# Patient Record
Sex: MALE | Race: BLACK OR AFRICAN AMERICAN | NOT HISPANIC OR LATINO
[De-identification: names, ages, dates, MRNs, and addresses within clinical notes are randomized per-mention and may not be internally consistent; named-entity substitution may affect disease eponyms.]

---

## 2017-10-24 PROBLEM — Z00.00 ENCOUNTER FOR PREVENTIVE HEALTH EXAMINATION: Status: ACTIVE | Noted: 2017-10-24

## 2017-12-05 ENCOUNTER — APPOINTMENT (OUTPATIENT)
Dept: UROLOGY | Facility: CLINIC | Age: 67
End: 2017-12-05
Payer: MEDICARE

## 2017-12-05 VITALS
SYSTOLIC BLOOD PRESSURE: 142 MMHG | WEIGHT: 200 LBS | HEIGHT: 67 IN | BODY MASS INDEX: 31.39 KG/M2 | HEART RATE: 60 BPM | DIASTOLIC BLOOD PRESSURE: 76 MMHG

## 2017-12-05 PROCEDURE — 99204 OFFICE O/P NEW MOD 45 MIN: CPT | Mod: 25

## 2017-12-05 PROCEDURE — 51798 US URINE CAPACITY MEASURE: CPT

## 2017-12-06 LAB — PSA SERPL-MCNC: 0.64 NG/ML

## 2018-05-17 ENCOUNTER — APPOINTMENT (OUTPATIENT)
Dept: UROLOGY | Facility: CLINIC | Age: 68
End: 2018-05-17
Payer: MEDICARE

## 2018-05-17 VITALS
OXYGEN SATURATION: 97 % | SYSTOLIC BLOOD PRESSURE: 145 MMHG | BODY MASS INDEX: 31.39 KG/M2 | WEIGHT: 200 LBS | DIASTOLIC BLOOD PRESSURE: 80 MMHG | HEIGHT: 67 IN | HEART RATE: 58 BPM | TEMPERATURE: 98.8 F

## 2018-05-17 PROCEDURE — 99213 OFFICE O/P EST LOW 20 MIN: CPT

## 2018-05-29 ENCOUNTER — OUTPATIENT (OUTPATIENT)
Dept: OUTPATIENT SERVICES | Facility: HOSPITAL | Age: 68
LOS: 1 days | End: 2018-05-29
Payer: COMMERCIAL

## 2018-05-29 DIAGNOSIS — I20.9 ANGINA PECTORIS, UNSPECIFIED: ICD-10-CM

## 2018-05-29 PROCEDURE — 93018 CV STRESS TEST I&R ONLY: CPT

## 2018-05-29 PROCEDURE — 78452 HT MUSCLE IMAGE SPECT MULT: CPT

## 2018-05-29 PROCEDURE — A9500: CPT

## 2018-05-29 PROCEDURE — 93017 CV STRESS TEST TRACING ONLY: CPT

## 2018-05-29 PROCEDURE — 93016 CV STRESS TEST SUPVJ ONLY: CPT

## 2018-05-29 PROCEDURE — 78452 HT MUSCLE IMAGE SPECT MULT: CPT | Mod: 26

## 2018-06-05 ENCOUNTER — APPOINTMENT (OUTPATIENT)
Dept: OTOLARYNGOLOGY | Facility: CLINIC | Age: 68
End: 2018-06-05
Payer: MEDICARE

## 2018-06-05 VITALS
DIASTOLIC BLOOD PRESSURE: 78 MMHG | HEART RATE: 52 BPM | BODY MASS INDEX: 32.49 KG/M2 | HEIGHT: 67 IN | SYSTOLIC BLOOD PRESSURE: 177 MMHG | WEIGHT: 207 LBS

## 2018-06-05 DIAGNOSIS — Z98.890 OTHER SPECIFIED POSTPROCEDURAL STATES: ICD-10-CM

## 2018-06-05 DIAGNOSIS — Z87.891 PERSONAL HISTORY OF NICOTINE DEPENDENCE: ICD-10-CM

## 2018-06-05 DIAGNOSIS — H91.93 UNSPECIFIED HEARING LOSS, BILATERAL: ICD-10-CM

## 2018-06-05 PROCEDURE — 92550 TYMPANOMETRY & REFLEX THRESH: CPT

## 2018-06-05 PROCEDURE — 99204 OFFICE O/P NEW MOD 45 MIN: CPT | Mod: 25

## 2018-06-05 PROCEDURE — 31575 DIAGNOSTIC LARYNGOSCOPY: CPT

## 2018-06-05 PROCEDURE — 92557 COMPREHENSIVE HEARING TEST: CPT

## 2018-07-17 ENCOUNTER — APPOINTMENT (OUTPATIENT)
Dept: OTOLARYNGOLOGY | Facility: CLINIC | Age: 68
End: 2018-07-17
Payer: MEDICARE

## 2018-07-17 VITALS
HEART RATE: 72 BPM | HEIGHT: 67 IN | BODY MASS INDEX: 32.02 KG/M2 | DIASTOLIC BLOOD PRESSURE: 73 MMHG | SYSTOLIC BLOOD PRESSURE: 123 MMHG | WEIGHT: 204 LBS

## 2018-07-17 DIAGNOSIS — R07.0 PAIN IN THROAT: ICD-10-CM

## 2018-07-17 PROCEDURE — 99214 OFFICE O/P EST MOD 30 MIN: CPT

## 2018-12-04 ENCOUNTER — APPOINTMENT (OUTPATIENT)
Dept: UROLOGY | Facility: CLINIC | Age: 68
End: 2018-12-04
Payer: MEDICARE

## 2018-12-04 VITALS
BODY MASS INDEX: 32.02 KG/M2 | HEIGHT: 67 IN | TEMPERATURE: 98.4 F | SYSTOLIC BLOOD PRESSURE: 161 MMHG | DIASTOLIC BLOOD PRESSURE: 91 MMHG | HEART RATE: 74 BPM | WEIGHT: 204 LBS

## 2018-12-04 PROCEDURE — 99213 OFFICE O/P EST LOW 20 MIN: CPT | Mod: 25

## 2018-12-04 PROCEDURE — 51741 ELECTRO-UROFLOWMETRY FIRST: CPT

## 2018-12-04 PROCEDURE — 51798 US URINE CAPACITY MEASURE: CPT

## 2018-12-05 LAB
APPEARANCE: CLEAR
BACTERIA: NEGATIVE
BILIRUBIN URINE: NEGATIVE
BLOOD URINE: NEGATIVE
COLOR: YELLOW
GLUCOSE QUALITATIVE U: NEGATIVE MG/DL
HYALINE CASTS: 1 /LPF
KETONES URINE: ABNORMAL
LEUKOCYTE ESTERASE URINE: NEGATIVE
MICROSCOPIC-UA: NORMAL
NITRITE URINE: NEGATIVE
PH URINE: 6
PROTEIN URINE: NEGATIVE MG/DL
PSA SERPL-MCNC: 0.58 NG/ML
RED BLOOD CELLS URINE: 1 /HPF
SPECIFIC GRAVITY URINE: 1.03
SQUAMOUS EPITHELIAL CELLS: 1 /HPF
UROBILINOGEN URINE: 1 MG/DL
WHITE BLOOD CELLS URINE: 1 /HPF

## 2018-12-10 LAB — BACTERIA UR CULT: NORMAL

## 2019-01-15 ENCOUNTER — APPOINTMENT (OUTPATIENT)
Dept: GASTROENTEROLOGY | Facility: CLINIC | Age: 69
End: 2019-01-15

## 2019-08-08 ENCOUNTER — APPOINTMENT (OUTPATIENT)
Dept: PULMONOLOGY | Facility: CLINIC | Age: 69
End: 2019-08-08
Payer: MEDICARE

## 2019-08-08 ENCOUNTER — OTHER (OUTPATIENT)
Age: 69
End: 2019-08-08

## 2019-08-08 VITALS
DIASTOLIC BLOOD PRESSURE: 59 MMHG | HEIGHT: 67 IN | OXYGEN SATURATION: 98 % | HEART RATE: 63 BPM | TEMPERATURE: 98.5 F | SYSTOLIC BLOOD PRESSURE: 104 MMHG

## 2019-08-08 VITALS — BODY MASS INDEX: 31.32 KG/M2 | WEIGHT: 200 LBS

## 2019-08-08 DIAGNOSIS — G47.9 SLEEP DISORDER, UNSPECIFIED: ICD-10-CM

## 2019-08-08 PROCEDURE — 99204 OFFICE O/P NEW MOD 45 MIN: CPT

## 2019-08-08 NOTE — REVIEW OF SYSTEMS
[EDS: ESS=____] : daytime somnolence: ESS=[unfilled] [Snoring] : snoring [Witnessed Apneas] : witnessed apnea [Obesity] : obesity [Nocturia] : nocturia [Negative] : Psychiatric [FreeTextEntry9] : HTN  [FreeTextEntry5] : BPH

## 2019-08-08 NOTE — PHYSICAL EXAM
[General Appearance - Well Developed] : well developed [Normal Appearance] : normal appearance [Well Groomed] : well groomed [No Deformities] : no deformities [General Appearance - Well Nourished] : well nourished [Normal Conjunctiva] : the conjunctiva exhibited no abnormalities [General Appearance - In No Acute Distress] : no acute distress [Eyelids - No Xanthelasma] : the eyelids demonstrated no xanthelasmas [III] : III [Neck Appearance] : the appearance of the neck was normal [Jugular Venous Distention Increased] : there was no jugular-venous distention [Neck Cervical Mass (___cm)] : no neck mass was observed [Thyroid Diffuse Enlargement] : the thyroid was not enlarged [Thyroid Nodule] : there were no palpable thyroid nodules [Heart Rate And Rhythm] : heart rate was normal and rhythm regular [Heart Sounds] : normal S1 and S2 [Heart Sounds Gallop] : no gallops [Murmurs] : no murmurs [Heart Sounds Pericardial Friction Rub] : no pericardial rub [Abnormal Walk] : normal gait [Motor Tone] : muscle strength and tone were normal [Nail Clubbing] : no clubbing of the fingernails [Musculoskeletal - Swelling] : no joint swelling seen [Petechial Hemorrhages (___cm)] : no petechial hemorrhages [Cyanosis, Localized] : no localized cyanosis [Skin Color & Pigmentation] : normal skin color and pigmentation [] : no rash [Deep Tendon Reflexes (DTR)] : deep tendon reflexes were 2+ and symmetric [Skin Turgor] : normal skin turgor [Sensation] : the sensory exam was normal to light touch and pinprick [No Focal Deficits] : no focal deficits [Impaired Insight] : insight and judgment were intact [Affect] : the affect was normal [Oriented To Time, Place, And Person] : oriented to person, place, and time

## 2019-08-08 NOTE — ASSESSMENT
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

## 2019-08-08 NOTE — HISTORY OF PRESENT ILLNESS
[FreeTextEntry1] : 08/08/2019 :  JOHN COOKSEY is a 68 year male with PMHx GERD on PPI,BPH,  who is here for\par \par

## 2019-08-08 NOTE — ASSESSMENT
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

## 2019-08-08 NOTE — END OF VISIT
[FreeTextEntry3] : Case reviewed and patient examined with PA, plans as noted.  Has dream enacting behavior by history, frequent, injured self at least once.   This can be dangerous, warned patient, asked him to start using melatonin 5-6mg HS now, pending overnight polysomnography to confirm dx.  Told him this can be start of degenerative neurologic disease. f/u after overnight polysomnography v

## 2019-08-08 NOTE — PHYSICAL EXAM
[Normal Appearance] : normal appearance [General Appearance - Well Developed] : well developed [Well Groomed] : well groomed [No Deformities] : no deformities [General Appearance - Well Nourished] : well nourished [General Appearance - In No Acute Distress] : no acute distress [Normal Conjunctiva] : the conjunctiva exhibited no abnormalities [Eyelids - No Xanthelasma] : the eyelids demonstrated no xanthelasmas [III] : III [Neck Appearance] : the appearance of the neck was normal [Neck Cervical Mass (___cm)] : no neck mass was observed [Jugular Venous Distention Increased] : there was no jugular-venous distention [Thyroid Nodule] : there were no palpable thyroid nodules [Thyroid Diffuse Enlargement] : the thyroid was not enlarged [Heart Sounds] : normal S1 and S2 [Heart Rate And Rhythm] : heart rate was normal and rhythm regular [Heart Sounds Gallop] : no gallops [Heart Sounds Pericardial Friction Rub] : no pericardial rub [Murmurs] : no murmurs [Abnormal Walk] : normal gait [Musculoskeletal - Swelling] : no joint swelling seen [Motor Tone] : muscle strength and tone were normal [Nail Clubbing] : no clubbing of the fingernails [Petechial Hemorrhages (___cm)] : no petechial hemorrhages [Cyanosis, Localized] : no localized cyanosis [Skin Color & Pigmentation] : normal skin color and pigmentation [Skin Turgor] : normal skin turgor [] : no rash [Deep Tendon Reflexes (DTR)] : deep tendon reflexes were 2+ and symmetric [Sensation] : the sensory exam was normal to light touch and pinprick [No Focal Deficits] : no focal deficits [Affect] : the affect was normal [Oriented To Time, Place, And Person] : oriented to person, place, and time [Impaired Insight] : insight and judgment were intact

## 2019-09-12 ENCOUNTER — OTHER (OUTPATIENT)
Age: 69
End: 2019-09-12

## 2019-10-02 ENCOUNTER — APPOINTMENT (OUTPATIENT)
Dept: SLEEP CENTER | Facility: HOSPITAL | Age: 69
End: 2019-10-02

## 2019-10-02 ENCOUNTER — OUTPATIENT (OUTPATIENT)
Dept: OUTPATIENT SERVICES | Facility: HOSPITAL | Age: 69
LOS: 1 days | End: 2019-10-02
Payer: MEDICARE

## 2019-10-02 DIAGNOSIS — G47.33 OBSTRUCTIVE SLEEP APNEA (ADULT) (PEDIATRIC): ICD-10-CM

## 2019-10-02 PROCEDURE — 95810 POLYSOM 6/> YRS 4/> PARAM: CPT

## 2019-10-02 PROCEDURE — 95810 POLYSOM 6/> YRS 4/> PARAM: CPT | Mod: 26

## 2019-10-04 ENCOUNTER — OTHER (OUTPATIENT)
Age: 69
End: 2019-10-04

## 2019-11-04 VITALS
SYSTOLIC BLOOD PRESSURE: 140 MMHG | RESPIRATION RATE: 17 BRPM | DIASTOLIC BLOOD PRESSURE: 80 MMHG | HEIGHT: 67 IN | WEIGHT: 199.96 LBS | TEMPERATURE: 98 F | OXYGEN SATURATION: 98 % | HEART RATE: 56 BPM

## 2019-11-04 RX ORDER — CHLORHEXIDINE GLUCONATE 213 G/1000ML
1 SOLUTION TOPICAL ONCE
Refills: 0 | Status: DISCONTINUED | OUTPATIENT
Start: 2019-11-06 | End: 2019-11-07

## 2019-11-04 NOTE — H&P ADULT - NSICDXPASTSURGICALHX_GEN_ALL_CORE_FT
PAST SURGICAL HISTORY:  H/O decompression of ulnar nerve     H/O hernia repair right inguinal    H/O repair of right rotator cuff x 2    H/O shoulder surgery

## 2019-11-04 NOTE — H&P ADULT - HISTORY OF PRESENT ILLNESS
69 yo active male with fhx of CVA and PMH HTN, HLD, glaucoma, BPH, and pre-diabetes who presented to his cardiologist, Dr. Rodriguez, with complaints of "tight" substernal 8/10 non-radiating chest pain and with exertion at 2-3 blocks ambulation, that resolves with 30 min of exercise. He states if he is swimming, it will resolve after going around 1/4 mile. Patient endorses some mild pedal edema. Denies any SOB, dizziness, palpitations, syncope, orthopnea, PND, melena, fevers, or chills. Stress echo 10/9/19: On B-B, stopped at 6:29 due to angina, reached 62% MPHR and 10 METS. Ventricular couplets seen on stress EKG. Post stress echo with mild global hypokinesis. Blunted HR and hypertensive response to exercise (began test at 168/84, Peak 174/90, highest 188/90 with 2 min rest post-stress). No comment on EF, however per MD note, stress MPI from 5/2018 with EF 59%. Due to patient's risk factors, abnormal stress test, and class III anginal symptoms, patient presents to [facility] for cardiac cath with PCI as clinically indicated.

## 2019-11-04 NOTE — H&P ADULT - ASSESSMENT
69 yo active male with fhx of CVA and PMH HTN, HLD, glaucoma, BPH, and pre-diabetes who presented to his cardiologist, Dr. Rodriguez, with complaints of CCS Class III Anginal symptoms, with subsequent Stress echo 10/9/19: On B-B, stopped at 6:29 due to angina, reached 62% MPHR and 10 METS. Ventricular couplets seen on stress EKG. Post stress echo with mild global hypokinesis. Blunted HR and hypertensive response to exercise (began test at 168/84, Peak 174/90, highest 188/90 with 2 min rest post-stress). No comment on EF, however per MD note, stress MPI from 5/2018 with EF 59%.   Given patient's risk factors, abnormal stress test, and class III anginal symptoms, patient now presents to [facility] for recommended cardiac cath with PCI as clinically indicated.     ASA:  III                                     Mallampati:  II  Risks & benefits of procedure and alternative therapy have been explained to the patient including but not limited to: allergic reaction, bleeding w/possible need for blood transfusion, infection, renal and vascular compromise, limb damage, arrhythmia, stroke, vessel dissection/perforation, Myocardial infarction, emergent CABG. Informed consent obtained and in chart.     Of note:  Pt took his home dose ASA 81mg today, Plavix 600mg load given.  NS @ 75x 4 hours for hydration.

## 2019-11-06 ENCOUNTER — INPATIENT (INPATIENT)
Facility: HOSPITAL | Age: 69
LOS: 0 days | Discharge: HOME | DRG: 251 | End: 2019-11-07
Attending: INTERNAL MEDICINE | Admitting: INTERNAL MEDICINE
Payer: MEDICARE

## 2019-11-06 DIAGNOSIS — Z98.890 OTHER SPECIFIED POSTPROCEDURAL STATES: Chronic | ICD-10-CM

## 2019-11-06 LAB
ALBUMIN SERPL ELPH-MCNC: 4.5 G/DL — SIGNIFICANT CHANGE UP (ref 3.3–5)
ALP SERPL-CCNC: 72 U/L — SIGNIFICANT CHANGE UP (ref 40–120)
ALT FLD-CCNC: 15 U/L — SIGNIFICANT CHANGE UP (ref 10–45)
ANION GAP SERPL CALC-SCNC: 11 MMOL/L — SIGNIFICANT CHANGE UP (ref 5–17)
APTT BLD: 30.2 SEC — SIGNIFICANT CHANGE UP (ref 27.5–36.3)
AST SERPL-CCNC: 18 U/L — SIGNIFICANT CHANGE UP (ref 10–40)
BASOPHILS # BLD AUTO: 0.05 K/UL — SIGNIFICANT CHANGE UP (ref 0–0.2)
BASOPHILS NFR BLD AUTO: 1 % — SIGNIFICANT CHANGE UP (ref 0–2)
BILIRUB SERPL-MCNC: 0.3 MG/DL — SIGNIFICANT CHANGE UP (ref 0.2–1.2)
BUN SERPL-MCNC: 22 MG/DL — SIGNIFICANT CHANGE UP (ref 7–23)
CALCIUM SERPL-MCNC: 8.9 MG/DL — SIGNIFICANT CHANGE UP (ref 8.4–10.5)
CHLORIDE SERPL-SCNC: 105 MMOL/L — SIGNIFICANT CHANGE UP (ref 96–108)
CHOLEST SERPL-MCNC: 225 MG/DL — HIGH (ref 10–199)
CO2 SERPL-SCNC: 25 MMOL/L — SIGNIFICANT CHANGE UP (ref 22–31)
CREAT SERPL-MCNC: 1.14 MG/DL — SIGNIFICANT CHANGE UP (ref 0.5–1.3)
EOSINOPHIL # BLD AUTO: 0.81 K/UL — HIGH (ref 0–0.5)
EOSINOPHIL NFR BLD AUTO: 15.4 % — HIGH (ref 0–6)
GLUCOSE SERPL-MCNC: 106 MG/DL — HIGH (ref 70–99)
HBA1C BLD-MCNC: 5.9 % — HIGH (ref 4–5.6)
HCT VFR BLD CALC: 42.4 % — SIGNIFICANT CHANGE UP (ref 39–50)
HDLC SERPL-MCNC: 77 MG/DL — SIGNIFICANT CHANGE UP
HGB BLD-MCNC: 13.6 G/DL — SIGNIFICANT CHANGE UP (ref 13–17)
IMM GRANULOCYTES NFR BLD AUTO: 0.6 % — SIGNIFICANT CHANGE UP (ref 0–1.5)
INR BLD: 0.99 — SIGNIFICANT CHANGE UP (ref 0.88–1.16)
LIPID PNL WITH DIRECT LDL SERPL: 129 MG/DL — HIGH
LYMPHOCYTES # BLD AUTO: 1.6 K/UL — SIGNIFICANT CHANGE UP (ref 1–3.3)
LYMPHOCYTES # BLD AUTO: 30.5 % — SIGNIFICANT CHANGE UP (ref 13–44)
MCHC RBC-ENTMCNC: 28.7 PG — SIGNIFICANT CHANGE UP (ref 27–34)
MCHC RBC-ENTMCNC: 32.1 GM/DL — SIGNIFICANT CHANGE UP (ref 32–36)
MCV RBC AUTO: 89.5 FL — SIGNIFICANT CHANGE UP (ref 80–100)
MONOCYTES # BLD AUTO: 0.78 K/UL — SIGNIFICANT CHANGE UP (ref 0–0.9)
MONOCYTES NFR BLD AUTO: 14.9 % — HIGH (ref 2–14)
NEUTROPHILS # BLD AUTO: 1.98 K/UL — SIGNIFICANT CHANGE UP (ref 1.8–7.4)
NEUTROPHILS NFR BLD AUTO: 37.6 % — LOW (ref 43–77)
NRBC # BLD: 0 /100 WBCS — SIGNIFICANT CHANGE UP (ref 0–0)
PLATELET # BLD AUTO: 241 K/UL — SIGNIFICANT CHANGE UP (ref 150–400)
POTASSIUM SERPL-MCNC: 3.8 MMOL/L — SIGNIFICANT CHANGE UP (ref 3.5–5.3)
POTASSIUM SERPL-SCNC: 3.8 MMOL/L — SIGNIFICANT CHANGE UP (ref 3.5–5.3)
PROT SERPL-MCNC: 7.1 G/DL — SIGNIFICANT CHANGE UP (ref 6–8.3)
PROTHROM AB SERPL-ACNC: 11.2 SEC — SIGNIFICANT CHANGE UP (ref 10–12.9)
RBC # BLD: 4.74 M/UL — SIGNIFICANT CHANGE UP (ref 4.2–5.8)
RBC # FLD: 12.9 % — SIGNIFICANT CHANGE UP (ref 10.3–14.5)
SODIUM SERPL-SCNC: 141 MMOL/L — SIGNIFICANT CHANGE UP (ref 135–145)
TOTAL CHOLESTEROL/HDL RATIO MEASUREMENT: 2.9 RATIO — LOW (ref 3.4–9.6)
TRIGL SERPL-MCNC: 96 MG/DL — SIGNIFICANT CHANGE UP (ref 10–149)
WBC # BLD: 5.25 K/UL — SIGNIFICANT CHANGE UP (ref 3.8–10.5)
WBC # FLD AUTO: 5.25 K/UL — SIGNIFICANT CHANGE UP (ref 3.8–10.5)

## 2019-11-06 PROCEDURE — 99221 1ST HOSP IP/OBS SF/LOW 40: CPT

## 2019-11-06 PROCEDURE — 92920 PRQ TRLUML C ANGIOP 1ART&/BR: CPT | Mod: RC

## 2019-11-06 PROCEDURE — 93458 L HRT ARTERY/VENTRICLE ANGIO: CPT | Mod: 26,59

## 2019-11-06 RX ORDER — LATANOPROST 0.05 MG/ML
1 SOLUTION/ DROPS OPHTHALMIC; TOPICAL AT BEDTIME
Refills: 0 | Status: DISCONTINUED | OUTPATIENT
Start: 2019-11-06 | End: 2019-11-07

## 2019-11-06 RX ORDER — CLOPIDOGREL BISULFATE 75 MG/1
600 TABLET, FILM COATED ORAL ONCE
Refills: 0 | Status: COMPLETED | OUTPATIENT
Start: 2019-11-06 | End: 2019-11-06

## 2019-11-06 RX ORDER — POTASSIUM CHLORIDE 20 MEQ
20 PACKET (EA) ORAL ONCE
Refills: 0 | Status: COMPLETED | OUTPATIENT
Start: 2019-11-06 | End: 2019-11-06

## 2019-11-06 RX ORDER — SODIUM CHLORIDE 9 MG/ML
500 INJECTION INTRAMUSCULAR; INTRAVENOUS; SUBCUTANEOUS
Refills: 0 | Status: DISCONTINUED | OUTPATIENT
Start: 2019-11-06 | End: 2019-11-07

## 2019-11-06 RX ORDER — CLOPIDOGREL BISULFATE 75 MG/1
75 TABLET, FILM COATED ORAL DAILY
Refills: 0 | Status: DISCONTINUED | OUTPATIENT
Start: 2019-11-07 | End: 2019-11-07

## 2019-11-06 RX ORDER — SODIUM CHLORIDE 9 MG/ML
500 INJECTION INTRAMUSCULAR; INTRAVENOUS; SUBCUTANEOUS
Refills: 0 | Status: DISCONTINUED | OUTPATIENT
Start: 2019-11-06 | End: 2019-11-06

## 2019-11-06 RX ORDER — INFLUENZA VIRUS VACCINE 15; 15; 15; 15 UG/.5ML; UG/.5ML; UG/.5ML; UG/.5ML
0.5 SUSPENSION INTRAMUSCULAR ONCE
Refills: 0 | Status: DISCONTINUED | OUTPATIENT
Start: 2019-11-06 | End: 2019-11-07

## 2019-11-06 RX ORDER — ASPIRIN/CALCIUM CARB/MAGNESIUM 324 MG
81 TABLET ORAL DAILY
Refills: 0 | Status: DISCONTINUED | OUTPATIENT
Start: 2019-11-07 | End: 2019-11-07

## 2019-11-06 RX ORDER — ATORVASTATIN CALCIUM 80 MG/1
40 TABLET, FILM COATED ORAL AT BEDTIME
Refills: 0 | Status: DISCONTINUED | OUTPATIENT
Start: 2019-11-06 | End: 2019-11-07

## 2019-11-06 RX ADMIN — CLOPIDOGREL BISULFATE 600 MILLIGRAM(S): 75 TABLET, FILM COATED ORAL at 08:35

## 2019-11-06 RX ADMIN — SODIUM CHLORIDE 75 MILLILITER(S): 9 INJECTION INTRAMUSCULAR; INTRAVENOUS; SUBCUTANEOUS at 13:53

## 2019-11-06 RX ADMIN — LATANOPROST 1 DROP(S): 0.05 SOLUTION/ DROPS OPHTHALMIC; TOPICAL at 21:36

## 2019-11-06 RX ADMIN — ATORVASTATIN CALCIUM 40 MILLIGRAM(S): 80 TABLET, FILM COATED ORAL at 21:35

## 2019-11-06 RX ADMIN — SODIUM CHLORIDE 75 MILLILITER(S): 9 INJECTION INTRAMUSCULAR; INTRAVENOUS; SUBCUTANEOUS at 08:35

## 2019-11-06 NOTE — CONSULT NOTE ADULT - SUBJECTIVE AND OBJECTIVE BOX
Preventive Cardiology Consultation Note    Cardiologist- Dr. Rodriguez     CHIEF COMPLAINT: s/p cardiac catheterization requiring cardiovascular prevention optimization and education    HISTORY OF PRESENT ILLNESS: 69 yo active male with fhx of CVA and PMH HTN, HLD, glaucoma, BPH, and pre-diabetes. Patient is now s/p Cardiac Catheterization 11/6/2019: PTCA to RCA, LM: normal, mLAD: 40%, dLCx: 50%, dRPDA: 80% diffuse, EF: 60%, EDP: 10mmHg, no AS/MR.    Review of systems otherwise negative.     Lifestyle History:  Mediterranean Diet Score (9 question survey) was 6.   (8-9: optimal, 6-7: near-optimal, 4-5: suboptimal, 0-3: markedly suboptimal)  Exercise: Patient reports exercising at a moderate level for >150 minutes per week.    Smoking: Former smoker (10 pack year history; quit 25 years ago).   Stress: Patient denies any stress.     PAST MEDICAL & SURGICAL HISTORY:  BPH (benign prostatic hyperplasia)  Glaucoma  Pre-diabetes  Hypertension  Hyperlipidemia  H/O hernia repair: right inguinal  H/O decompression of ulnar nerve  H/O shoulder surgery  H/O repair of right rotator cuff: x 2    FAMILY HISTORY:   CVA - father at 60 yo and mother at 83 yo.    Allergies:   No Known Allergies      HOME MEDICATIONS:   latanoprost 0.005% ophthalmic solution: 1 drop(s) to each affected eye once a day (in the evening) (04 Nov 2019 18:22)      PHYSICAL EXAM:  T(C): 36.8 (11-06-19 @ 14:00), Max: 36.8 (11-06-19 @ 14:00)  T(F): 98.2 (11-06-19 @ 14:00), Max: 98.2 (11-06-19 @ 14:00)  HR: 60 (11-06-19 @ 13:44) (60 - 60)  BP: 143/63 (11-06-19 @ 13:44) (143/63 - 143/63)  RR: 17 (11-06-19 @ 13:44) (17 - 17)  SpO2: 99% (11-06-19 @ 13:44) (99% - 99%)  Height (cm): 170.18 (11-06 @ 07:55)  Weight (kg): 90.7 (11-06 @ 07:55)  BMI (kg/m2): 31.3 (11-06 @ 07:55)  	  Gen- awake, conversive  Head-NCAT; eyes: no corneal arcus noted b/l; no xanthelasmas   Neck- no thyromegaly, no cervical LAD, no JVD, no carotid bruit b/l  Respiratory- good air entry b/l, no WRR  Cardiovascular- S1S2, RRR, no MRG appr, no LE edema b/l, distal pulses 2+ b/l  Gastrointestinal- no HSM, no masses  Neurology- moves all extremities, no focal deficits  Psych- normal affect, non-depressed mood  Skin- no lesions, no rashes, no xanthomas     LABS:	                        13.6   5.25  )-----------( 241      ( 06 Nov 2019 07:29 )             42.4     11-06    141  |  105  |  22  ----------------------------<  106<H>  3.8   |  25  |  1.14    Ca    8.9      06 Nov 2019 07:29    TPro  7.1  /  Alb  4.5  /  TBili  0.3  /  DBili  x   /  AST  18  /  ALT  15  /  AlkPhos  72  11-06      Hemoglobin A1C, Whole Blood: 5.9 % (11-06 @ 07:29)      Cholesterol, Serum: 225 mg/dL (11-06 @ 07:29)  HDL Cholesterol, Serum: 77 mg/dL (11-06 @ 07:29)  Triglycerides, Serum: 96 mg/dL (11-06 @ 07:29)  Direct LDL: 129 mg/dL (11-06 @ 07:29)      ASSESSMENT/RECOMMENDATIONS: 	  Patient's dietary, exercise and overall lifestyle habits were reviewed. The concept of atherosclerosis and its systemic nature was discussed with a focus on the need to get all cardiovascular risk factors to goal. At this time, I would like to make the following recommendations to optimize atherosclerotic risk factors.     RECOMMENDATIONS:   Anti-platelet Therapy: DAPT per interventionalist recommendation.   Lipid Therapy: High dose statin therapy would likely benefit this patient. In general, we recommend the use of atorvastatin or rosuvastatin, as tolerated. Along with lifestyle modifications, we recommend starting the patient on either of those agents, with the goal of lowering his LDL-C by 50%.   Hypertension: Blood pressures during this stay were well-controlled.   Mediterranean Diet Score is 6. Some suggestions include continue incorporating 2 or more servings per day of vegetables, fruits, and whole grains. Increase intake of fish and legumes/beans to 2 or more servings per week. Aim to increase intake of healthy fats, such as olive oil and avocados, and have a handful of nuts/seeds most days. Reduce red/processed meat consumption to 2 or fewer times per week.   Exercise: Recommended gradually increasing activity to 30-45 minutes most days of the week once cleared by referring cardiologist. Cardiac rehab might benefit this patient and is covered by major insurance plans (other than co-pays), please refer.   Medication Adherence: Patient has no issues with adherence at this time.   Smoking: This patient is a non-smoker.   Obesity/Overweight: The patient was advised about specific mechanisms such as reduced portions and increased activity for efforts toward weight loss.   Glucometabolic State: Patient has known prediabetes. Based on HbA1c 5.9% today, he is still in the prediabetic range. We recommend following up with his PCP for continued monitoring, and to initiate treatment if necessary.   Sleep Apnea: The patient is at low risk for sleep apnea.   Psychological Stress: The patient appears to be coping with stressors well at this time.     Thank you for the opportunity to see this patient. Please feel free to contact Prevention if there are any questions, or if you feel that your patient would benefit from continued follow-up visits with the Program.    I am acting as a scribe on behalf of Dr. Alexandrea Fleming,    Clara Garza, [facility]  Cardiovascular Prevention     Alexandrea Fleming MD  System Director, Cardiovascular Prevention

## 2019-11-07 ENCOUNTER — TRANSCRIPTION ENCOUNTER (OUTPATIENT)
Age: 69
End: 2019-11-07

## 2019-11-07 VITALS — RESPIRATION RATE: 18 BRPM | HEART RATE: 63 BPM | DIASTOLIC BLOOD PRESSURE: 87 MMHG | SYSTOLIC BLOOD PRESSURE: 134 MMHG

## 2019-11-07 LAB
ALBUMIN SERPL ELPH-MCNC: 4.1 G/DL — SIGNIFICANT CHANGE UP (ref 3.3–5)
ALP SERPL-CCNC: 61 U/L — SIGNIFICANT CHANGE UP (ref 40–120)
ALT FLD-CCNC: 13 U/L — SIGNIFICANT CHANGE UP (ref 10–45)
ANION GAP SERPL CALC-SCNC: 10 MMOL/L — SIGNIFICANT CHANGE UP (ref 5–17)
AST SERPL-CCNC: 19 U/L — SIGNIFICANT CHANGE UP (ref 10–40)
BILIRUB SERPL-MCNC: 0.4 MG/DL — SIGNIFICANT CHANGE UP (ref 0.2–1.2)
BUN SERPL-MCNC: 14 MG/DL — SIGNIFICANT CHANGE UP (ref 7–23)
CALCIUM SERPL-MCNC: 9.3 MG/DL — SIGNIFICANT CHANGE UP (ref 8.4–10.5)
CHLORIDE SERPL-SCNC: 103 MMOL/L — SIGNIFICANT CHANGE UP (ref 96–108)
CO2 SERPL-SCNC: 26 MMOL/L — SIGNIFICANT CHANGE UP (ref 22–31)
CREAT SERPL-MCNC: 1.16 MG/DL — SIGNIFICANT CHANGE UP (ref 0.5–1.3)
GLUCOSE SERPL-MCNC: 97 MG/DL — SIGNIFICANT CHANGE UP (ref 70–99)
HCT VFR BLD CALC: 40 % — SIGNIFICANT CHANGE UP (ref 39–50)
HCV AB S/CO SERPL IA: 0.07 S/CO — SIGNIFICANT CHANGE UP
HCV AB SERPL-IMP: SIGNIFICANT CHANGE UP
HGB BLD-MCNC: 13.4 G/DL — SIGNIFICANT CHANGE UP (ref 13–17)
MAGNESIUM SERPL-MCNC: 2.3 MG/DL — SIGNIFICANT CHANGE UP (ref 1.6–2.6)
MCHC RBC-ENTMCNC: 29.1 PG — SIGNIFICANT CHANGE UP (ref 27–34)
MCHC RBC-ENTMCNC: 33.5 GM/DL — SIGNIFICANT CHANGE UP (ref 32–36)
MCV RBC AUTO: 86.8 FL — SIGNIFICANT CHANGE UP (ref 80–100)
NRBC # BLD: 0 /100 WBCS — SIGNIFICANT CHANGE UP (ref 0–0)
PLATELET # BLD AUTO: 241 K/UL — SIGNIFICANT CHANGE UP (ref 150–400)
POTASSIUM SERPL-MCNC: 3.9 MMOL/L — SIGNIFICANT CHANGE UP (ref 3.5–5.3)
POTASSIUM SERPL-SCNC: 3.9 MMOL/L — SIGNIFICANT CHANGE UP (ref 3.5–5.3)
PROT SERPL-MCNC: 7.1 G/DL — SIGNIFICANT CHANGE UP (ref 6–8.3)
RBC # BLD: 4.61 M/UL — SIGNIFICANT CHANGE UP (ref 4.2–5.8)
RBC # FLD: 13 % — SIGNIFICANT CHANGE UP (ref 10.3–14.5)
SODIUM SERPL-SCNC: 139 MMOL/L — SIGNIFICANT CHANGE UP (ref 135–145)
WBC # BLD: 6.06 K/UL — SIGNIFICANT CHANGE UP (ref 3.8–10.5)
WBC # FLD AUTO: 6.06 K/UL — SIGNIFICANT CHANGE UP (ref 3.8–10.5)

## 2019-11-07 PROCEDURE — 99239 HOSP IP/OBS DSCHRG MGMT >30: CPT

## 2019-11-07 RX ORDER — POTASSIUM CHLORIDE 20 MEQ
20 PACKET (EA) ORAL ONCE
Refills: 0 | Status: COMPLETED | OUTPATIENT
Start: 2019-11-07 | End: 2019-11-07

## 2019-11-07 RX ORDER — LATANOPROST 0.05 MG/ML
1 SOLUTION/ DROPS OPHTHALMIC; TOPICAL
Qty: 0 | Refills: 0 | DISCHARGE

## 2019-11-07 RX ORDER — ATORVASTATIN CALCIUM 80 MG/1
1 TABLET, FILM COATED ORAL
Qty: 30 | Refills: 3
Start: 2019-11-07 | End: 2020-03-05

## 2019-11-07 RX ORDER — AMLODIPINE BESYLATE AND BENAZEPRIL HYDROCHLORIDE 10; 20 MG/1; MG/1
1 CAPSULE ORAL
Qty: 0 | Refills: 0 | DISCHARGE

## 2019-11-07 RX ORDER — LANOLIN ALCOHOL/MO/W.PET/CERES
1 CREAM (GRAM) TOPICAL
Qty: 0 | Refills: 0 | DISCHARGE

## 2019-11-07 RX ORDER — LISINOPRIL 2.5 MG/1
20 TABLET ORAL DAILY
Refills: 0 | Status: DISCONTINUED | OUTPATIENT
Start: 2019-11-07 | End: 2019-11-07

## 2019-11-07 RX ORDER — CLOPIDOGREL BISULFATE 75 MG/1
1 TABLET, FILM COATED ORAL
Qty: 30 | Refills: 11
Start: 2019-11-07 | End: 2020-10-31

## 2019-11-07 RX ORDER — AMLODIPINE BESYLATE 2.5 MG/1
5 TABLET ORAL DAILY
Refills: 0 | Status: DISCONTINUED | OUTPATIENT
Start: 2019-11-07 | End: 2019-11-07

## 2019-11-07 RX ORDER — ASPIRIN/CALCIUM CARB/MAGNESIUM 324 MG
1 TABLET ORAL
Qty: 30 | Refills: 11
Start: 2019-11-07 | End: 2020-10-31

## 2019-11-07 RX ADMIN — Medication 20 MILLIEQUIVALENT(S): at 09:43

## 2019-11-07 RX ADMIN — LISINOPRIL 20 MILLIGRAM(S): 2.5 TABLET ORAL at 11:23

## 2019-11-07 RX ADMIN — Medication 81 MILLIGRAM(S): at 11:23

## 2019-11-07 RX ADMIN — AMLODIPINE BESYLATE 5 MILLIGRAM(S): 2.5 TABLET ORAL at 09:43

## 2019-11-07 RX ADMIN — CLOPIDOGREL BISULFATE 75 MILLIGRAM(S): 75 TABLET, FILM COATED ORAL at 11:23

## 2019-11-07 NOTE — DISCHARGE NOTE PROVIDER - NSDCMRMEDTOKEN_GEN_ALL_CORE_FT
amlodipine-benazepril 5 mg-20 mg oral capsule: 1 cap(s) orally once a day  Hemp Gummies: 1 tab(s) orally once a day  latanoprost 0.005% ophthalmic solution: 1 drop(s) to each affected eye once a day (in the evening)  Melatonin: 1 tab(s) orally once a day  Move Free Joints: 1 tab(s) orally once a day  naproxen 500 mg oral tablet: 1 tab(s) orally 2 times a day  Nerve Renew: 1 tab(s) orally once a day amlodipine-benazepril 5 mg-20 mg oral capsule: 1 cap(s) orally once a day  aspirin 81 mg oral delayed release tablet: 1 tab(s) orally once a day  atorvastatin 40 mg oral tablet: 1 tab(s) orally once a day (at bedtime)  clopidogrel 75 mg oral tablet: 1 tab(s) orally once a day  Hemp Gummies: 1 tab(s) orally once a day  latanoprost 0.005% ophthalmic solution: 1 drop(s) to each affected eye once a day (in the evening)  Melatonin: 1 tab(s) orally once a day  Move Free Joints: 1 tab(s) orally once a day  Nerve Renew: 1 tab(s) orally once a day

## 2019-11-07 NOTE — DISCHARGE NOTE PROVIDER - NSDCFUADDAPPT_GEN_ALL_CORE_FT
•	Your procedure was done through your groin and wrist   •	You do not need to keep this area covered and you may shower  •	Please avoid any heavy lifting  (no more than 3 to 5 lbs) or strenuous activity for five days  •	If you develop any swelling, bleeding, hardening of the skin (hematoma formation), acute pain, numbness/tingling  in your leg or wrist please contact your doctor immediately or call our 24/7 line: [phone] •	Your procedure was done through your groin and wrist   •	You do not need to keep this area covered and you may shower  •	Please avoid any heavy lifting  (no more than 3 to 5 lbs) or strenuous activity for five days  •	If you develop any swelling, bleeding, hardening of the skin (hematoma formation), acute pain, numbness/tingling  in your leg or wrist please contact your doctor immediately or call our 24/7 line: [phone]

## 2019-11-07 NOTE — DISCHARGE NOTE PROVIDER - NSDCCPCAREPLAN_GEN_ALL_CORE_FT
PRINCIPAL DISCHARGE DIAGNOSIS  Diagnosis: Coronary artery disease  Assessment and Plan of Treatment:       SECONDARY DISCHARGE DIAGNOSES  Diagnosis: Hyperlipidemia  Assessment and Plan of Treatment:     Diagnosis: Hypertension  Assessment and Plan of Treatment: PRINCIPAL DISCHARGE DIAGNOSIS  Diagnosis: Coronary artery disease  Assessment and Plan of Treatment: You underwent a cardiac catheterization (11/6/2019) and the blockage in your Right Coronary Artery was opened with balloon angioplasty.   Please take Aspirin 81 mg daily and Plavix 75 mg daily to keep the blockage open in your heart.   NEVER MISS A DOSE OF ASPIRIN OR PLAVIX; IF YOU DO, YOU ARE AT RISK OF YOUR STENT CLOSING AND HAVING A HEART ATTACK. DO NOT STOP THESE TWO MEDICATIONS UNLESS INSTRUCTED TO DO SO BY YOUR CARDIOLOGIST  Please follow up with your cardiologist Dr. Rodriguez in 1-2 weeks for a check up on your heart      SECONDARY DISCHARGE DIAGNOSES  Diagnosis: Hyperlipidemia  Assessment and Plan of Treatment:     Diagnosis: Hypertension  Assessment and Plan of Treatment: PRINCIPAL DISCHARGE DIAGNOSIS  Diagnosis: Coronary artery disease  Assessment and Plan of Treatment: You underwent a cardiac catheterization (11/6/2019) and the blockage in your Right Coronary Artery was opened with balloon angioplasty.   Please take Aspirin 81 mg daily and Plavix 75 mg daily to keep the blockage open in your heart.   NEVER MISS A DOSE OF ASPIRIN OR PLAVIX; IF YOU DO, YOU ARE AT RISK OF YOUR STENT CLOSING AND HAVING A HEART ATTACK. DO NOT STOP THESE TWO MEDICATIONS UNLESS INSTRUCTED TO DO SO BY YOUR CARDIOLOGIST  Please follow up with your cardiologist Dr. Rodriguez in 1-2 weeks for a check up on your heart      SECONDARY DISCHARGE DIAGNOSES  Diagnosis: NSAID long-term use  Assessment and Plan of Treatment: Please stop taking your Naproxen while you are taking Aspirin 81 mg daily and Plavix 75 mg daily.  Taking NSAIDS such as Naproxen while taking Aspirin and Plavix puts you at an increased risk for bleeding, stomach ulcers and GI upset.   You may take Tylenol instead.       Diagnosis: Hyperlipidemia  Assessment and Plan of Treatment: Please start taking Atorvastatin 40 mg daily to prevent further plaque build up in your arteries.    Diagnosis: Hypertension  Assessment and Plan of Treatment: Please continue to take your Amlodipine-Benazepril 5-20 mg daily to control your blood pressure. PRINCIPAL DISCHARGE DIAGNOSIS  Diagnosis: Coronary artery disease  Assessment and Plan of Treatment: You underwent a cardiac catheterization (11/6/2019) and the blockage in your Right Coronary Artery was opened with balloon angioplasty.   Please take Aspirin 81 mg daily and Plavix 75 mg daily to keep the blockage open in your heart.   NEVER MISS A DOSE OF ASPIRIN OR PLAVIX; IF YOU DO, YOU ARE AT RISK OF YOUR STENT CLOSING AND HAVING A HEART ATTACK. DO NOT STOP THESE TWO MEDICATIONS UNLESS INSTRUCTED TO DO SO BY YOUR CARDIOLOGIST  Please follow up with your cardiologist Dr. Rodriguez in 1-2 weeks for a check up on your heart      SECONDARY DISCHARGE DIAGNOSES  Diagnosis: NSAID long-term use  Assessment and Plan of Treatment: Please stop taking your Naproxen while you are taking Aspirin 81 mg daily and Plavix 75 mg daily.  Taking NSAIDS such as Naproxen while taking Aspirin and Plavix puts you at an increased risk for bleeding, stomach ulcers and GI upset.   You may take Tylenol instead.       Diagnosis: Hyperlipidemia  Assessment and Plan of Treatment: Please start taking Atorvastatin 40 mg daily to prevent further plaque build up in your arteries.  -Your cholesterol panel is abnormal. Your LDL is 129 mg/dL and your goal LDL is less than 70 mg/dL. LDL is also known as "bad cholesterol" because it takes cholesterol to your arteries, where it may collect in artery walls. Too much cholesterol in your arteries may lead to a buildup of plaque known as atherosclerosis and can cause heart disease.    Diagnosis: Hypertension  Assessment and Plan of Treatment: Please continue to take your Amlodipine-Benazepril 5-20 mg daily to control your blood pressure.

## 2019-11-07 NOTE — DISCHARGE NOTE PROVIDER - HOSPITAL COURSE
67 yo active male with fhx of CVA and PMH HTN, HLD, glaucoma, BPH, and pre-diabetes who presented to his cardiologist, Dr. Rodriguez, with complaints of "tight" substernal 8/10 non-radiating chest pain and with exertion at 2-3 blocks ambulation, that resolves with 30 min of exercise. He states if he is swimming, it will resolve after going around 1/4 mile. Patient endorses some mild pedal edema.  . Stress echo 10/9/19: On B-B, stopped at 6:29 due to angina, reached 62% MPHR and 10 METS. Ventricular couplets seen on stress EKG. Post stress echo with mild global hypokinesis. Blunted HR and hypertensive response to exercise (began test at 168/84, Peak 174/90, highest 188/90 with 2 min rest post-stress). No comment on EF, however per MD note, stress MPI from 5/2018 with EF 59%. Patient was subsequently referred for cardiac catheterization (11/6/2019) revealing 2VCAD: LM normal, 40% mLAD, 50% dLCx, 80% pRCA s/p PTCA with 10% residual (difficult to engage), 80% diffuse distal RPDA, LVEF 60%, LVEDP 10 mmHG. Patient recommended for medical management of residual disease.         Patient seen and examined at bedside this morning. Patient with no complaints and is out of bed ambulating. Right radial access stable with no hematoma, no bleed, 2+ radial pulse. Right groin s/p angioseal placement with no hematoma, no bleed, distal pulse intact. Lab values, telemetry and vital sins reviewed and remained stable overnight. Discharge medication regimen reviewed with plan for patient to take Aspirin 81 mg daily, Plavix 75 mg daily, Lipitor 40 mg daily, Amlodipine-Benazapril 5-20 mg daily. In addition, patient educated on importance of avoiding NSAIDS while taking DAPT (patient was taking Naproxen BID at home). Case has been reviewed with Dr. Bolaños and patient is cleared for discharge. Patient will follow up with Dr. Morenita Rodriguez within 1-2 weeks of discharge. All discharge instructions reviewed with patient and medications e-prescribed to patient’s pharmacy.

## 2019-11-07 NOTE — DISCHARGE NOTE PROVIDER - CARE PROVIDER_API CALL
Morenita Rodriguez)  Cardiovascular Disease; Internal Medicine  [address] [place] [address]  Phone: [phone]  Fax: [phone]  Follow Up Time: 1 week

## 2019-11-07 NOTE — DISCHARGE NOTE NURSING/CASE MANAGEMENT/SOCIAL WORK - NSDCFUADDAPPT_GEN_ALL_CORE_FT
•	Your procedure was done through your groin and wrist   •	You do not need to keep this area covered and you may shower  •	Please avoid any heavy lifting  (no more than 3 to 5 lbs) or strenuous activity for five days  •	If you develop any swelling, bleeding, hardening of the skin (hematoma formation), acute pain, numbness/tingling  in your leg or wrist please contact your doctor immediately or call our 24/7 line: [phone]

## 2019-11-07 NOTE — DISCHARGE NOTE NURSING/CASE MANAGEMENT/SOCIAL WORK - PATIENT PORTAL LINK FT
You can access the FollowMyHealth Patient Portal offered by [facility] by registering at the following website: [email or web address]. By joining [facility]’s FollowMyHealth portal, you will also be able to view your health information using other applications (apps) compatible with our system.

## 2019-11-12 DIAGNOSIS — H40.9 UNSPECIFIED GLAUCOMA: ICD-10-CM

## 2019-11-12 DIAGNOSIS — N40.0 BENIGN PROSTATIC HYPERPLASIA WITHOUT LOWER URINARY TRACT SYMPTOMS: ICD-10-CM

## 2019-11-12 DIAGNOSIS — R73.03 PREDIABETES: ICD-10-CM

## 2019-11-12 DIAGNOSIS — I25.10 ATHEROSCLEROTIC HEART DISEASE OF NATIVE CORONARY ARTERY WITHOUT ANGINA PECTORIS: ICD-10-CM

## 2019-11-12 DIAGNOSIS — I10 ESSENTIAL (PRIMARY) HYPERTENSION: ICD-10-CM

## 2019-11-12 DIAGNOSIS — E78.5 HYPERLIPIDEMIA, UNSPECIFIED: ICD-10-CM

## 2019-12-17 PROCEDURE — 82553 CREATINE MB FRACTION: CPT

## 2019-12-17 PROCEDURE — C1769: CPT

## 2019-12-17 PROCEDURE — 82550 ASSAY OF CK (CPK): CPT

## 2019-12-17 PROCEDURE — 80053 COMPREHEN METABOLIC PANEL: CPT

## 2019-12-17 PROCEDURE — 83036 HEMOGLOBIN GLYCOSYLATED A1C: CPT

## 2019-12-17 PROCEDURE — 83735 ASSAY OF MAGNESIUM: CPT

## 2019-12-17 PROCEDURE — C1760: CPT

## 2019-12-17 PROCEDURE — 84484 ASSAY OF TROPONIN QUANT: CPT

## 2019-12-17 PROCEDURE — 85027 COMPLETE CBC AUTOMATED: CPT

## 2019-12-17 PROCEDURE — 36415 COLL VENOUS BLD VENIPUNCTURE: CPT

## 2019-12-17 PROCEDURE — 85610 PROTHROMBIN TIME: CPT

## 2019-12-17 PROCEDURE — C1874: CPT

## 2019-12-17 PROCEDURE — 85730 THROMBOPLASTIN TIME PARTIAL: CPT

## 2019-12-17 PROCEDURE — C1725: CPT

## 2019-12-17 PROCEDURE — 86803 HEPATITIS C AB TEST: CPT

## 2019-12-17 PROCEDURE — 85025 COMPLETE CBC W/AUTO DIFF WBC: CPT

## 2019-12-17 PROCEDURE — C1887: CPT

## 2019-12-17 PROCEDURE — C1894: CPT

## 2019-12-17 PROCEDURE — 80061 LIPID PANEL: CPT

## 2020-01-08 PROBLEM — I10 ESSENTIAL (PRIMARY) HYPERTENSION: Chronic | Status: ACTIVE | Noted: 2019-11-04

## 2020-01-08 PROBLEM — H40.9 UNSPECIFIED GLAUCOMA: Chronic | Status: ACTIVE | Noted: 2019-11-04

## 2020-01-08 PROBLEM — E78.5 HYPERLIPIDEMIA, UNSPECIFIED: Chronic | Status: ACTIVE | Noted: 2019-11-04

## 2020-01-08 PROBLEM — N40.0 BENIGN PROSTATIC HYPERPLASIA WITHOUT LOWER URINARY TRACT SYMPTOMS: Chronic | Status: ACTIVE | Noted: 2019-11-04

## 2020-01-08 PROBLEM — R73.03 PREDIABETES: Chronic | Status: ACTIVE | Noted: 2019-11-04

## 2020-01-09 ENCOUNTER — APPOINTMENT (OUTPATIENT)
Dept: PULMONOLOGY | Facility: CLINIC | Age: 70
End: 2020-01-09
Payer: MEDICARE

## 2020-01-09 VITALS
OXYGEN SATURATION: 98 % | HEART RATE: 68 BPM | WEIGHT: 201.31 LBS | TEMPERATURE: 98.1 F | HEIGHT: 67 IN | DIASTOLIC BLOOD PRESSURE: 78 MMHG | SYSTOLIC BLOOD PRESSURE: 137 MMHG | BODY MASS INDEX: 31.6 KG/M2

## 2020-01-09 DIAGNOSIS — E66.9 OBESITY, UNSPECIFIED: ICD-10-CM

## 2020-01-09 PROCEDURE — 99214 OFFICE O/P EST MOD 30 MIN: CPT
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
## 2020-01-09 NOTE — REVIEW OF SYSTEMS
[EDS: ESS=____] : daytime somnolence: ESS=[unfilled] [Recent Wt Gain (___ Lbs)] : no recent weight gain [Fatigue] : no fatigue [Negative] : Psychiatric

## 2020-01-09 NOTE — PHYSICAL EXAM
[General Appearance - Well Developed] : well developed [Well Groomed] : well groomed [Normal Appearance] : normal appearance [General Appearance - In No Acute Distress] : no acute distress [III] : III [No Deformities] : no deformities [] : 2+ [Auscultation Breath Sounds / Voice Sounds] : lungs were clear to auscultation bilaterally [Motor Tone] : muscle strength and tone were normal [Musculoskeletal - Swelling] : no joint swelling seen [Nail Clubbing] : no clubbing  or cyanosis of the fingernails [Abnormal Walk] : normal gait [Sensation] : the sensory exam was normal to light touch and pinprick [No Focal Deficits] : no focal deficits [Deep Tendon Reflexes (DTR)] : deep tendon reflexes were 2+ and symmetric [Oriented To Time, Place, And Person] : oriented to person, place, and time [FreeTextEntry1] : normal muscle tone, no tremor [Impaired Insight] : insight and judgment were intact [Affect] : the affect was normal

## 2020-01-09 NOTE — ASSESSMENT
[FreeTextEntry1] : 1) REMBD\par no evidence neurologic disease underlying\par melatonin appears to be effective- told him should use 3-6mg, read labels\par \par 2) ? obstructive sleep apnea \par overnight polysomnography suggests obstructive sleep apnea but poor sleep.  Snores, mild excessive daytime somnolence. Get unattended home sleep testing to evaluate.  Could have pseudoREMBD from obstructive sleep apnea but doubt unless severe

## 2020-01-21 ENCOUNTER — OUTPATIENT (OUTPATIENT)
Dept: OUTPATIENT SERVICES | Facility: HOSPITAL | Age: 70
LOS: 1 days | End: 2020-01-21
Payer: MEDICARE

## 2020-01-21 ENCOUNTER — APPOINTMENT (OUTPATIENT)
Dept: SLEEP CENTER | Facility: HOME HEALTH | Age: 70
End: 2020-01-21
Payer: MEDICARE

## 2020-01-21 DIAGNOSIS — Z98.890 OTHER SPECIFIED POSTPROCEDURAL STATES: Chronic | ICD-10-CM

## 2020-01-21 PROCEDURE — 95800 SLP STDY UNATTENDED: CPT

## 2020-01-21 PROCEDURE — 95800 SLP STDY UNATTENDED: CPT | Mod: 26

## 2020-01-22 DIAGNOSIS — G47.33 OBSTRUCTIVE SLEEP APNEA (ADULT) (PEDIATRIC): ICD-10-CM

## 2020-02-20 ENCOUNTER — APPOINTMENT (OUTPATIENT)
Dept: PULMONOLOGY | Facility: CLINIC | Age: 70
End: 2020-02-20
Payer: MEDICARE

## 2020-02-20 VITALS
OXYGEN SATURATION: 97 % | HEIGHT: 67 IN | BODY MASS INDEX: 31.55 KG/M2 | WEIGHT: 201 LBS | TEMPERATURE: 97.7 F | SYSTOLIC BLOOD PRESSURE: 129 MMHG | DIASTOLIC BLOOD PRESSURE: 77 MMHG | HEART RATE: 62 BPM

## 2020-02-20 DIAGNOSIS — G47.33 OBSTRUCTIVE SLEEP APNEA (ADULT) (PEDIATRIC): ICD-10-CM

## 2020-02-20 DIAGNOSIS — G47.52 REM SLEEP BEHAVIOR DISORDER: ICD-10-CM

## 2020-02-20 PROCEDURE — 99214 OFFICE O/P EST MOD 30 MIN: CPT

## 2020-02-20 NOTE — PHYSICAL EXAM
[General Appearance - Well Developed] : well developed [Normal Appearance] : normal appearance [Well Groomed] : well groomed [General Appearance - Well Nourished] : well nourished [No Deformities] : no deformities [Normal Oropharynx] : normal oropharynx [General Appearance - In No Acute Distress] : no acute distress [II] : II [Retrognathia] : retrognathia [Abnormal Walk] : normal gait [Musculoskeletal - Swelling] : no joint swelling seen [Motor Tone] : muscle strength and tone were normal [Nail Clubbing] : no clubbing of the fingernails [Cyanosis, Localized] : no localized cyanosis [Petechial Hemorrhages (___cm)] : no petechial hemorrhages [] : no ischemic changes [Sensation] : the sensory exam was normal to light touch and pinprick [Deep Tendon Reflexes (DTR)] : deep tendon reflexes were 2+ and symmetric [Oriented To Time, Place, And Person] : oriented to person, place, and time [No Focal Deficits] : no focal deficits [Affect] : the affect was normal [Impaired Insight] : insight and judgment were intact [FreeTextEntry1] : mild retrognathia

## 2020-02-20 NOTE — ASSESSMENT
[FreeTextEntry1] : 1) REM behvaior disorder\par Probably better with melatonin- needs to be consistent\par obstructive sleep apnea may contribute as well\par \par 2) obstructive sleep apnea\par Does have moderate obstructive sleep apnea on unattended home sleep testing (although again short sleep time).  Treatment options for sleep disordered breathing were discussed.  The most rapid and successful treatment remains nasal CPAP or BilevelPAP.  Alternatives include upper airway surgery such as uvulopharyngoplasty or a dental appliance (better for milder cases).  Recently hypoglossal nerve stimulation has been used.  Positional therapy (avoidance of supine posture) can be helpful, and all patients should try to maintain a healthy weight and avoid alcohol or other sedating medications close to bedtime \par The patient is advised that in addition to worsening sleep leading to daytime sleepiness, sleep apnea may be associated with worsening hypertension and may be a risk factor for cardiovascular disease and stroke.\par \par Treatment will be ordered with autotitrating CPAP, which will be downloaded after a few weeks of use to check compliance and optimize pressure based on efficacy.  If not comfortable with this treatment, polysomnography with CPAP titration may be needed.
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
## 2020-04-02 ENCOUNTER — APPOINTMENT (OUTPATIENT)
Dept: PULMONOLOGY | Facility: CLINIC | Age: 70
End: 2020-04-02

## 2020-08-07 ENCOUNTER — APPOINTMENT (OUTPATIENT)
Dept: UROLOGY | Facility: CLINIC | Age: 70
End: 2020-08-07
Payer: MEDICARE

## 2020-08-07 VITALS — DIASTOLIC BLOOD PRESSURE: 67 MMHG | SYSTOLIC BLOOD PRESSURE: 100 MMHG | HEART RATE: 66 BPM | TEMPERATURE: 97.6 F

## 2020-08-07 PROCEDURE — 99213 OFFICE O/P EST LOW 20 MIN: CPT

## 2020-08-07 NOTE — ASSESSMENT
[FreeTextEntry1] : BPH\par unclear why BPH medications were discontinued\par on isosorbide\par resume alfuzosin and finasteride\par considering urolift\par f/u 1 motnh

## 2020-08-07 NOTE — HISTORY OF PRESENT ILLNESS
[FreeTextEntry1] : last seen 2 years ago\par reports nocuturai x 10\par previously was on alfuzosin/finasteride\par now off\par +decreased FOS\par +urgency\par no incontiennce\par PSA 0.6\par

## 2020-09-17 ENCOUNTER — APPOINTMENT (OUTPATIENT)
Dept: UROLOGY | Facility: CLINIC | Age: 70
End: 2020-09-17
Payer: MEDICARE

## 2020-09-17 VITALS
HEART RATE: 77 BPM | OXYGEN SATURATION: 96 % | SYSTOLIC BLOOD PRESSURE: 118 MMHG | DIASTOLIC BLOOD PRESSURE: 75 MMHG | TEMPERATURE: 97.6 F

## 2020-09-17 PROCEDURE — 51741 ELECTRO-UROFLOWMETRY FIRST: CPT

## 2020-09-17 PROCEDURE — 51798 US URINE CAPACITY MEASURE: CPT | Mod: 59

## 2020-09-17 PROCEDURE — 99213 OFFICE O/P EST LOW 20 MIN: CPT | Mod: 25

## 2020-09-17 NOTE — HISTORY OF PRESENT ILLNESS
[FreeTextEntry1] : now back on alfuzosin and finasteride\par mild dizziness which resolved\par no interest in urolift at this time\par wants to stay with medication\par IPSS 16\par PVR 14cc\par uroflow: qmax 14 mil/sec/PVR 99\par nocturia x 2-3\par

## 2020-09-17 NOTE — ASSESSMENT
[FreeTextEntry1] : BPH \par continue alfuzosin and finasteride\par on isosorbide \par f/u 3 months\par consider urolift

## 2021-01-08 ENCOUNTER — APPOINTMENT (OUTPATIENT)
Dept: UROLOGY | Facility: CLINIC | Age: 71
End: 2021-01-08
Payer: MEDICARE

## 2021-01-08 VITALS
HEIGHT: 67 IN | WEIGHT: 203 LBS | BODY MASS INDEX: 31.86 KG/M2 | TEMPERATURE: 97.7 F | HEART RATE: 67 BPM | SYSTOLIC BLOOD PRESSURE: 127 MMHG | DIASTOLIC BLOOD PRESSURE: 79 MMHG

## 2021-01-08 PROCEDURE — 99072 ADDL SUPL MATRL&STAF TM PHE: CPT

## 2021-01-08 PROCEDURE — 99213 OFFICE O/P EST LOW 20 MIN: CPT

## 2021-01-08 NOTE — HISTORY OF PRESENT ILLNESS
[FreeTextEntry1] : on alfuzosin and finasteride\par increasing complaints of urinary force of stream\par +nocutira\par increasing bother\par here to discuss otpions

## 2021-02-12 ENCOUNTER — APPOINTMENT (OUTPATIENT)
Dept: UROLOGY | Facility: CLINIC | Age: 71
End: 2021-02-12
Payer: MEDICARE

## 2021-02-12 VITALS — SYSTOLIC BLOOD PRESSURE: 127 MMHG | HEART RATE: 73 BPM | DIASTOLIC BLOOD PRESSURE: 76 MMHG | TEMPERATURE: 95.6 F

## 2021-02-12 PROCEDURE — 99072 ADDL SUPL MATRL&STAF TM PHE: CPT

## 2021-02-12 PROCEDURE — 99213 OFFICE O/P EST LOW 20 MIN: CPT

## 2021-02-12 NOTE — ASSESSMENT
[FreeTextEntry1] : BPH\par increasing bother\par interested in outlet surgery\par for cystoscopy and TRUS if decides to proceed\par

## 2021-02-12 NOTE — HISTORY OF PRESENT ILLNESS
[FreeTextEntry1] : BPH on alfuzosin and finasteride\par decreased FOS\par increasing nocutira\par unable to get coverage for cialis\par

## 2021-03-12 ENCOUNTER — APPOINTMENT (OUTPATIENT)
Dept: UROLOGY | Facility: CLINIC | Age: 71
End: 2021-03-12
Payer: MEDICARE

## 2021-03-12 VITALS — SYSTOLIC BLOOD PRESSURE: 139 MMHG | HEART RATE: 78 BPM | DIASTOLIC BLOOD PRESSURE: 84 MMHG

## 2021-03-12 PROCEDURE — 52000 CYSTOURETHROSCOPY: CPT

## 2021-03-12 PROCEDURE — 76872 US TRANSRECTAL: CPT

## 2021-03-12 PROCEDURE — 99072 ADDL SUPL MATRL&STAF TM PHE: CPT

## 2021-03-12 PROCEDURE — 99214 OFFICE O/P EST MOD 30 MIN: CPT | Mod: 25,57

## 2021-03-12 NOTE — ASSESSMENT
[FreeTextEntry1] : BPH\par We spoke at length about the role of transurethral thermal ablation of the prostate/Rezum. He understands that this is a minimally invasive procedure which may help significantly with his lower urinary tract symptoms and get him off of medications. Risks, including pelvic pain, gross hematuria, dysuria and short lived urinary urgency were discussed at length. He understands that he will require a postoperative catheter for 2-3 days in most instances and that he is likely to return to work within a day or two. He was told that there is a very low risk of ejaculatory and erectile dysfunction postoperatively. Improvements in urinary symptoms are seen in 1-6 months after the procedure.\par The risks and benefits of transurethral resection of the prostate were discussed at length today. Given his symptoms and exam findings, I told him he has an approximately 70% likelihood of improvement in his LUTS/urinary retention in the postoperative period. He understands that this means that some men do not improve after surgery. The role of postop catheterization, and the potential for a longer term mtz were discussed at length. The risk of urinary tract infection and possible sepsis along with its associated sequelae were discussed. The very low risk of urinary incontinence as well as bladder overactivity were discussed. The risk of gross hematuria, and the fact that he cannot perform strenous activity for one month were also reported. He understands that he is likely to have retrograde ejaculation following this procedure.  \par He is considering Rezum\par will check with insurance and consider scheduling thereafter

## 2021-03-12 NOTE — HISTORY OF PRESENT ILLNESS
[FreeTextEntry1] : see attached cysto and transrectal ultraosund\par +large intravesical median lobe\par increasing bother\par here to dsicuss

## 2021-03-29 NOTE — H&P ADULT - NSHPLABSRESULTS_GEN_ALL_CORE
How Severe Is It?: moderate Is This A New Presentation, Or A Follow-Up?: Follow Up Isotretinoin Additional History: Patient reports 100% compliance with both forms of birth control. Patient is fasting today. Dryness is tolerable. Has stopped the spironolactone. 13.6   5.25  )-----------( 241      ( 06 Nov 2019 07:29 )             42.4               PT/INR - ( 06 Nov 2019 07:29 )   PT: 11.2 sec;   INR: 0.99          PTT - ( 06 Nov 2019 07:29 )  PTT:30.2 sec              EKG: 13.6   5.25  )-----------( 241      ( 06 Nov 2019 07:29 )             42.4       11-06    141  |  105  |  22  ----------------------------<  106<H>  3.8   |  25  |  1.14    Ca    8.9      06 Nov 2019 07:29    TPro  7.1  /  Alb  4.5  /  TBili  0.3  /  DBili  x   /  AST  18  /  ALT  15  /  AlkPhos  72  11-06      PT/INR - ( 06 Nov 2019 07:29 )   PT: 11.2 sec;   INR: 0.99          PTT - ( 06 Nov 2019 07:29 )  PTT:30.2 sec    CARDIAC MARKERS ( 06 Nov 2019 07:29 )  x     / <0.01 ng/mL / 230 U/L / x     / 4.1 ng/mL    EKG: SB @ 55bpm, 1st deg AVB, no acute ischemic changes

## 2021-04-25 ENCOUNTER — TRANSCRIPTION ENCOUNTER (OUTPATIENT)
Age: 71
End: 2021-04-25

## 2021-04-26 ENCOUNTER — OUTPATIENT (OUTPATIENT)
Dept: OUTPATIENT SERVICES | Facility: HOSPITAL | Age: 71
LOS: 1 days | Discharge: HOME | End: 2021-04-26
Payer: MEDICARE

## 2021-04-26 ENCOUNTER — APPOINTMENT (OUTPATIENT)
Dept: UROLOGY | Facility: AMBULATORY SURGERY CENTER | Age: 71
End: 2021-04-26

## 2021-04-26 DIAGNOSIS — Z98.890 OTHER SPECIFIED POSTPROCEDURAL STATES: Chronic | ICD-10-CM

## 2021-04-26 PROCEDURE — 53854 TRURL DSTRJ PRST8 TISS RF WV: CPT

## 2021-04-30 ENCOUNTER — APPOINTMENT (OUTPATIENT)
Dept: UROLOGY | Facility: CLINIC | Age: 71
End: 2021-04-30
Payer: MEDICARE

## 2021-04-30 VITALS — HEART RATE: 73 BPM | TEMPERATURE: 97.4 F | DIASTOLIC BLOOD PRESSURE: 69 MMHG | SYSTOLIC BLOOD PRESSURE: 104 MMHG

## 2021-04-30 PROCEDURE — 99024 POSTOP FOLLOW-UP VISIT: CPT

## 2021-04-30 PROCEDURE — 51798 US URINE CAPACITY MEASURE: CPT

## 2021-05-28 ENCOUNTER — APPOINTMENT (OUTPATIENT)
Dept: UROLOGY | Facility: CLINIC | Age: 71
End: 2021-05-28
Payer: MEDICARE

## 2021-05-28 VITALS — HEART RATE: 61 BPM | SYSTOLIC BLOOD PRESSURE: 139 MMHG | DIASTOLIC BLOOD PRESSURE: 74 MMHG | TEMPERATURE: 98 F

## 2021-05-28 PROCEDURE — 51798 US URINE CAPACITY MEASURE: CPT

## 2021-05-28 PROCEDURE — 51741 ELECTRO-UROFLOWMETRY FIRST: CPT

## 2021-05-28 PROCEDURE — 99024 POSTOP FOLLOW-UP VISIT: CPT

## 2021-05-28 NOTE — ASSESSMENT
[FreeTextEntry1] : BPH\par improving symptoms s/p Rezum\par +urgency\par oxybutynin 5m ER\par f/u 2 motnhs

## 2021-05-28 NOTE — HISTORY OF PRESENT ILLNESS
8/2/2019       RE: Mikayla Timmons  [id number]  [facility] [address]     Dear Colleague,    Thank you for referring your patient, Mikayla Timmons, to the [facility]. Please see a copy of my visit note below.    Spine Surgery Consultation    REFERRING PHYSICIAN: Raoul Peacock   PRIMARY CARE PHYSICIAN: Nini Heaton           Chief Complaint:   Consult (back pain )      History of Present Illness:  Symptom Profile Including: location of symptoms, onset, severity, exacerbating/alleviating factors, previous treatments:        Mikayla Timmons is a 75 year old female who presents to me as a referral from her hip replacement surgeon with a history of a recurrently dislocating left hip prosthesis.  She has had multiple surgeries for this.  She also has severe dysesthetic type pain in the left thigh.  It is very tender to palpation and she feels like it is hard to stand on that left side.  She is aware that with even small movements the left hip might dislocate.  She is considering another revision surgery for hip dislocation and during the work-up for this it was discovered that she had scoliosis.  She is referred to me to see if the scoliosis could be contributing to her recurrent hip dislocations.         Past Medical History:   No past medical history on file.         Past Surgical History:   No past surgical history on file.         Social History:     Social History     Tobacco Use     Smoking status: Never Smoker     Smokeless tobacco: Never Used   Substance Use Topics     Alcohol use: Yes     Comment: Lightly            Family History:   No family history on file.         Allergies:     Allergies   Allergen Reactions     Hydromorphone Itching     Ketamine Other (See Comments)            Medications:     Current Outpatient Medications   Medication     blood glucose (NO BRAND SPECIFIED) test strip     blood glucose monitoring (ONE TOUCH DELICA)  "lancets     Blood Glucose Monitoring Suppl (ONE TOUCH ULTRA)     Cholecalciferol (VITAMIN D) 1000 UNITS capsule     cyanocobalamin (VITAMIN B-12) 1000 MCG tablet     insulin aspart (NOVOLOG FLEXPEN) 100 UNIT/ML pen     insulin NPH (HUMULIN N/NOVOLIN N VIAL) 100 UNIT/ML vial     insulin pen needle (31G X 5 MM) 31G X 5 MM miscellaneous     insulin syringe-needle U-100 (31G X 5/16\" 0.3 ML) 31G X 5/16\" 0.3 ML miscellaneous     magnesium oxide 400 MG CAPS     melatonin 5 MG tablet     STATIN NOT PRESCRIBED (INTENTIONAL)     thyroid (ARMOUR) 120 MG tablet     No current facility-administered medications for this visit.              Review of Systems:     A 10 point ROS was performed and reviewed. Specific responses to these questions are noted at the end of the document.         Physical Exam:   Vitals: Wt 100.2 kg (221 lb)   BMI 36.78 kg/m     Constitutional: awake, alert, cooperative, no apparent distress, appears stated age.    Eyes: The sclera are white.  Ears, Nose, Throat: The trachea is midline.  Psychiatric: The patient has a normal affect.  Respiratory: breathing non-labored  Cardiovascular: The extremities are warm and perfused.  Skin: no obvious rashes or lesions.  Musculoskeletal, Neurologic, and Spine:          Lumbar Spine:    Appearance - No gross stepoffs or deformities    Motor -     L2-3: Hip flexion 5/5 R and 5/5 L strength          L3/4:  Knee extension R 5/5 and L 5/5 strength         L4/5:  Foot dorsiflexion R 5/5 L 5/5 and       EHL dorsiflexion R 4/5 L 4/5 strength         S1:  Plantarflexion/Peroneal Muscles  R 5/5 and L 5/5 strength    Sensation: intact to light touch L3-S1 distribution BLE, dyesthetic pain over left thigh.  Numbness over left thigh.      Neurologic:      REFLEXES Left Right             Brachioradialis 1+ 1+   Patella 1+ 1+   Ankle jerk 1+ 1+   Babinski No upgoing great toe No upgoing great toe   Clonus 0 beats 0 beats     Hip Exam:  No pain with hip log roll and no tenderness " [FreeTextEntry1] : 1 month s/p Rezum\par nocutira improving\par IPSS 22\par PVR to r/o obstruction 47cc\par uroflow - qmax 10.5 ml/sec/bell/69cc voided vol over the greater trochanters.    Alignment:  Patient stands with a neutral standing sagittal balance.           Imaging:   We ordered and independently reviewed new radiographs at this clinic visit. The results were discussed with the patient.  Findings include:    Standing scoliosis radiographs show preserved global coronal and sagittal balance.  Idiopathic appearing scoliosis is present.  Idiopathic appearing scoliotic curvature.  She has a history of a recurrently dislocating hip prosthesis.             Assessment and Plan:   Assessment:  75 year old female with idiopathic scoliosis and recurrent left hip prosthetic dislocations.     Plan:  1. The patient does not have severe pelvic retroversion on her standing images.  I think that her global coronal and sagittal alignment is acceptable.  She has no back pain.  Based on this I would not recommend any surgical intervention for the spine.  I do not think that her current alignment is contributing substantially to risk for hip dislocation and I would not consider spinal realignment surgery for that reason.  I encouraged her to maintain follow-up with her hip replacement surgeon.  I see no spine related contraindications to undergoing a hip surgery.      Respectfully,  Reggie Knox MD  Spine Surgery  [facility]

## 2021-08-27 ENCOUNTER — APPOINTMENT (OUTPATIENT)
Dept: UROLOGY | Facility: CLINIC | Age: 71
End: 2021-08-27
Payer: MEDICARE

## 2021-08-27 VITALS — HEART RATE: 66 BPM | DIASTOLIC BLOOD PRESSURE: 78 MMHG | SYSTOLIC BLOOD PRESSURE: 134 MMHG | TEMPERATURE: 97.2 F

## 2021-08-27 PROCEDURE — 99213 OFFICE O/P EST LOW 20 MIN: CPT

## 2021-08-27 RX ORDER — ALFUZOSIN HYDROCHLORIDE 10 MG/1
10 TABLET, EXTENDED RELEASE ORAL DAILY
Qty: 90 | Refills: 3 | Status: DISCONTINUED | COMMUNITY
Start: 2020-08-07 | End: 2021-08-27

## 2021-08-27 RX ORDER — ALFUZOSIN HYDROCHLORIDE 10 MG/1
10 TABLET, EXTENDED RELEASE ORAL
Qty: 30 | Refills: 0 | Status: DISCONTINUED | COMMUNITY
Start: 2017-04-01 | End: 2021-08-27

## 2021-08-27 RX ORDER — FINASTERIDE 5 MG/1
5 TABLET, FILM COATED ORAL DAILY
Qty: 90 | Refills: 3 | Status: DISCONTINUED | COMMUNITY
Start: 2020-08-07 | End: 2021-08-27

## 2021-08-27 RX ORDER — ALFUZOSIN HYDROCHLORIDE 10 MG/1
10 TABLET, EXTENDED RELEASE ORAL DAILY
Qty: 90 | Refills: 3 | Status: DISCONTINUED | COMMUNITY
Start: 2017-12-05 | End: 2021-08-27

## 2021-08-27 RX ORDER — FINASTERIDE 5 MG/1
5 TABLET, FILM COATED ORAL
Qty: 30 | Refills: 0 | Status: DISCONTINUED | COMMUNITY
Start: 2017-07-13 | End: 2021-08-27

## 2021-08-27 RX ORDER — FINASTERIDE 5 MG/1
5 TABLET, FILM COATED ORAL DAILY
Qty: 90 | Refills: 3 | Status: DISCONTINUED | COMMUNITY
Start: 2017-12-05 | End: 2021-08-27

## 2021-08-27 RX ORDER — ALFUZOSIN HYDROCHLORIDE 10 MG/1
10 TABLET, EXTENDED RELEASE ORAL
Refills: 0 | Status: DISCONTINUED | COMMUNITY
End: 2021-08-27

## 2021-08-27 NOTE — ASSESSMENT
[FreeTextEntry1] : BPH\par occasional urge incontiennce\par genearlly much better\par "definitely a change"\par resume oxybutyin 5mg ER\par f/u 6 months

## 2021-08-27 NOTE — HISTORY OF PRESENT ILLNESS
[FreeTextEntry1] : 4 months s/p Rezum\par now off of alfuzosin finasteride and oxybutynin\par notable improveemnt in symptoms\par urgency is persistent\par erectile function is imporveing\par IPSS 6!\par generally happy\par occasional urge incotnience

## 2022-02-07 NOTE — DISCHARGE NOTE PROVIDER - INSTRUCTIONS
Subjective:   Patient ID:  Randy Yap is a 70 y.o. male who presents for evaluation of Congestive Heart Failure      HPI     Randy Yap is a 70 year old male who presents to Arrhythmia clinic for 6 month follow up with device check. His current medical conditions include CHF, HFrEF of 30%, PVT on Amiodarone,HTN, HLP, Obesity, HONEY on CPAP. He returns today and states he is doing well.     Recent ECHO with improvement in EF from 20% to 30%    Denies chest pain or anginal equivalents. No shortness of breath, ALDANA or palpitations. Denies orthopnea, PND or abdominal bloating. Reports regular walking without any issues lately. NO leg swelling or claudications. No recent falls, syncope or near syncopal events. Reports compliance with medications and dietary restrictions. NO CNS complaints to suggest TIA or CVA today. No signs of abnormal bleeding on Eliquis.     Results for orders placed during the hospital encounter of 01/25/22    Echo Saline Bubble? No    Interpretation Summary  · The left ventricle is moderately enlarged with concentric hypertrophy and moderately decreased systolic function.  · Mild left atrial enlargement.  · The estimated ejection fraction is 30%.  · Grade I left ventricular diastolic dysfunction.  · There is moderate left ventricular global hypokinesis.  · Normal right ventricular size with normal right ventricular systolic function.  · Mild mitral regurgitation.          Past Medical History:   Diagnosis Date    Asthma     Cardiomyopathy due to systemic disease     Colon polyp 5/2013    repeat 5/2018    Depression, recurrent     Diastolic dysfunction     DJD (degenerative joint disease) of knee 10/14/2013    Hyperlipidemia     Hypertension     Murmur     Paroxysmal VT 5/21/2016    Pericarditis with effusion     Sleep apnea        Past Surgical History:   Procedure Laterality Date    CARDIAC DEFIBRILLATOR PLACEMENT  10/6/2014    COLONOSCOPY W/ POLYPECTOMY  5/21/2013    repeat  5/21/2018    HERNIA REPAIR      R inguinal    LEFT HEART CATHETERIZATION Left 7/9/2018    Procedure: CATHETERIZATION, HEART, LEFT;  Surgeon: Macey Dos Santos MD;  Location[facility] CATH LAB;  Service: Cardiology;  Laterality: Left;  left radial approach  Has [employer] ICD    pace maker   10/6/2014    PERICARDIAL WINDOW  12-15 years ago    tonsillectomy      VARICOCELECTOMY         Social History     Tobacco Use    Smoking status: Never Smoker    Smokeless tobacco: Never Used   Substance Use Topics    Alcohol use: Yes     Alcohol/week: 0.8 standard drinks     Types: 1 Standard drinks or equivalent per week     Comment: occasional glass of wine on social occasions    Drug use: No       Family History   Problem Relation Age of Onset    Hyperlipidemia Mother     Arrhythmia Mother     Heart disease Father 48    Heart attack Father 48    Hypertension Father     Hypertension Maternal Grandmother     Hypertension Maternal Grandfather     Heart disease Paternal Grandmother     Hypertension Paternal Grandmother     Heart attack Paternal Grandmother     Heart disease Paternal Grandfather     Hypertension Paternal Grandfather     Heart attack Paternal Grandfather      Wt Readings from Last 3 Encounters:   02/07/22 122.5 kg (270 lb 1 oz)   01/25/22 121.6 kg (268 lb)   10/01/21 121.6 kg (268 lb)     Temp Readings from Last 3 Encounters:   08/02/21 98.5 °F (36.9 °C) (Oral)   02/01/21 97.8 °F (36.6 °C)   07/31/20 98.1 °F (36.7 °C) (Oral)     BP Readings from Last 3 Encounters:   02/07/22 118/66   01/25/22 107/75   10/01/21 107/75     Pulse Readings from Last 3 Encounters:   02/07/22 88   01/25/22 73   10/01/21 73     Current Outpatient Medications on File Prior to Visit   Medication Sig Dispense Refill    acetaminophen (TYLENOL) 325 MG tablet Take 650 mg by mouth as needed.       albuterol (PROVENTIL/VENTOLIN HFA) 90 mcg/actuation inhaler Inhale 1 puff into the lungs once daily. Rescue 18 g 4    amiodarone  (PACERONE) 100 MG Tab Take 1.5 tablets (150 mg total) by mouth once daily. (Patient taking differently: Take 100 mg by mouth once daily.) 45 tablet 11    ARNUITY ELLIPTA 100 mcg/actuation inhaler INHALE 1 PUFF BY MOUTH ONCE DAILY  30 each 11    carvediloL (COREG) 25 MG tablet TAKE 1 TABLET BY MOUTH TWICE DAILY 180 tablet 1    co-enzyme Q-10 30 mg capsule Take 30 mg by mouth once daily.       ENTRESTO  mg per tablet TAKE 1 TABLET BY MOUTH TWICE DAILY 180 tablet 0    FARXIGA 10 mg tablet TAKE 1 TABLET BY MOUTH ONCE DAILY 90 tablet 3    ferrous sulfate (FEOSOL) 325 mg (65 mg iron) Tab tablet Take 325 mg by mouth daily with breakfast.      furosemide (LASIX) 40 MG tablet TAKE 1 TABLET BY MOUTH ONCE DAILY 90 tablet 0    glucosamine-chondroitin 500-400 mg tablet Take 1 tablet by mouth once daily.       INV apixaban/placebo tablet Take 1 tablets (5 mg) by mouth 2 (two) times daily. FOR INVESTIGATIONAL USE ONLY. Protocol: [place] 2017.307 subject # : [id number] 400 tablet 0    INV aspirin/placebo tablet Take 1 tablet (81 mg total) by mouth once daily. FOR INVESTIGATIONAL USE ONLY. Patient Study #: [id number] Protocol: [place] 2017.307 200 tablet 0    multivitamin (THERAGRAN) tablet Take 1 tablet by mouth once daily.      pravastatin (PRAVACHOL) 80 MG tablet Take 1 tablet (80 mg total) by mouth once daily. 30 tablet 11    SHINGRIX, PF, 50 mcg/0.5 mL injection       spironolactone (ALDACTONE) 25 MG tablet Take 1 tablet (25 mg total) by mouth 2 (two) times daily. 180 tablet 3    traZODone (DESYREL) 150 MG tablet Take 1 tablet (150 mg total) by mouth every evening. 90 tablet 4    [DISCONTINUED] meclizine (ANTIVERT) 25 mg tablet Take 25 mg by mouth 3 (three) times daily as needed.      bepotastine besilate (BEPREVE) 1.5 % Drop Bepreve 1.5 % eye drops   Apply by ophthalmic route as needed for 50 days.      [DISCONTINUED] ondansetron (ZOFRAN-ODT) 4 MG TbDL Take 4 mg by mouth every 8 (eight) hours as needed.       "[DISCONTINUED] XIIDRA 5 % Dpet        No current facility-administered medications on file prior to visit.         Review of Systems   Constitutional: Positive for malaise/fatigue.   HENT: Negative for hearing loss and hoarse voice.    Eyes: Negative for visual disturbance.   Cardiovascular: Negative for chest pain, claudication, dyspnea on exertion, irregular heartbeat, leg swelling, near-syncope, orthopnea, palpitations, paroxysmal nocturnal dyspnea and syncope.   Respiratory: Negative for cough, hemoptysis, shortness of breath, sleep disturbances due to breathing, snoring and wheezing.    Endocrine: Negative for cold intolerance and heat intolerance.   Hematologic/Lymphatic: Does not bruise/bleed easily.   Skin: Negative for color change, dry skin and nail changes.   Musculoskeletal: Positive for arthritis and back pain. Negative for joint pain and myalgias.   Gastrointestinal: Negative for bloating, abdominal pain, constipation, nausea and vomiting.   Genitourinary: Negative for dysuria, flank pain, hematuria and hesitancy.   Neurological: Negative for headaches, light-headedness, loss of balance, numbness, paresthesias and weakness.   Psychiatric/Behavioral: Negative for altered mental status.   Allergic/Immunologic: Negative for environmental allergies.       Objective:/66 (BP Location: Left arm, Patient Position: Sitting)   Pulse 88   Ht 5' 10" (1.778 m)   Wt 122.5 kg (270 lb 1 oz)   SpO2 96%   BMI 38.75 kg/m²      Physical Exam  Vitals and nursing note reviewed.   Constitutional:       General: He is not in acute distress.     Appearance: Normal appearance. He is well-developed and well-nourished. He is obese. He is not ill-appearing.   HENT:      Head: Normocephalic and atraumatic.      Nose: Nose normal.      Mouth/Throat:      Mouth: Mucous membranes are moist.   Eyes:      Pupils: Pupils are equal, round, and reactive to light.   Neck:      Thyroid: No thyromegaly.      Vascular: No JVD.      " Trachea: No tracheal deviation.   Cardiovascular:      Rate and Rhythm: Normal rate and regular rhythm.      Chest Wall: PMI is not displaced.      Pulses: Intact distal pulses.           Radial pulses are 2+ on the right side and 2+ on the left side.        Dorsalis pedis pulses are 2+ on the right side and 2+ on the left side.      Heart sounds: S1 normal and S2 normal. Heart sounds not distant. No murmur heard.      Pulmonary:      Effort: Pulmonary effort is normal. No respiratory distress.      Breath sounds: Normal breath sounds and air entry. No decreased breath sounds, wheezing, rhonchi or rales.   Abdominal:      General: Bowel sounds are normal. There is no distension.      Palpations: Abdomen is soft.      Tenderness: There is no abdominal tenderness.   Musculoskeletal:         General: Edema present. Normal range of motion.      Cervical back: Full passive range of motion without pain, normal range of motion and neck supple.      Right ankle: No swelling.      Left ankle: No swelling.   Skin:     General: Skin is warm and dry.      Capillary Refill: Capillary refill takes less than 2 seconds.      Nails: There is no clubbing or cyanosis.   Neurological:      General: No focal deficit present.      Mental Status: He is alert and oriented to person, place, and time.   Psychiatric:         Mood and Affect: Mood and affect and mood normal.         Speech: Speech normal.         Behavior: Behavior normal.         Thought Content: Thought content normal.         Cognition and Memory: Cognition and memory normal.         Judgment: Judgment normal.         Lab Results   Component Value Date    CHOL 171 08/23/2021    CHOL 170 07/23/2021    CHOL 213 (H) 08/04/2020     Lab Results   Component Value Date    HDL 41 08/23/2021    HDL 39 (L) 07/23/2021    HDL 44 08/04/2020     Lab Results   Component Value Date    LDLCALC 104.4 08/23/2021    LDLCALC 93.6 07/23/2021    LDLCALC 138.6 08/04/2020     Lab Results    Component Value Date    TRIG 128 08/23/2021    TRIG 187 (H) 07/23/2021    TRIG 152 (H) 08/04/2020     Lab Results   Component Value Date    CHOLHDL 24.0 08/23/2021    CHOLHDL 22.9 07/23/2021    CHOLHDL 20.7 08/04/2020       Chemistry        Component Value Date/Time     10/01/2021 1353    K 4.1 10/01/2021 1353     10/01/2021 1353    CO2 22 (L) 10/01/2021 1353    BUN 13 10/01/2021 1353    CREATININE 1.0 10/01/2021 1353    GLU 95 10/01/2021 1353        Component Value Date/Time    CALCIUM 9.6 10/01/2021 1353    ALKPHOS 37 (L) 07/23/2021 0851    AST 29 07/23/2021 0851    ALT 33 07/23/2021 0851    BILITOT 0.6 07/23/2021 0851    ESTGFRAFRICA >60.0 10/01/2021 1353    EGFRNONAA >60.0 10/01/2021 1353          Lab Results   Component Value Date    TSH 1.950 07/23/2021     Lab Results   Component Value Date    INR 1.0 07/06/2018     Lab Results   Component Value Date    WBC 7.76 07/23/2021    HGB 13.6 (L) 07/23/2021    HCT 42.4 07/23/2021    MCV 96 07/23/2021     07/23/2021        Assessment:      1. Type 2 diabetes mellitus with hyperglycemia, without long-term current use of insulin    2. Congestive heart failure with left ventricular diastolic dysfunction, NYHA class 3    3. Idiopathic cardiomyopathy    4. PAF (paroxysmal atrial fibrillation)    5. Paroxysmal VT    6. Subacute viral endocarditis    7. At risk for amiodarone toxicity with long term use    8. Class 3 severe obesity due to excess calories with serious comorbidity and body mass index (BMI) of 40.0 to 44.9 in adult        Plan:     Continue same meds for now  Noted improvement in EF with addition of Farxiga  Check A1C today  Dash diet, 2gm sodium restriction  1500 ml fluid restriction  Encourage daily walking  Continue nightly CPAP therapy  RTC in 6 months with FAS and Device check    Nicole May, FNP-C [facility]     A Mediterranean Diet is recommended! Some suggestions include continue incorporating 2 or more servings per day of vegetables, fruits, and whole grains. Increase intake of fish and legumes/beans to 2 or more servings per week. Aim to increase intake of healthy fats, such as olive oil and avocados, and have a handful of nuts/seeds most days. Reduce red/processed meat consumption to 2 or fewer times per week.

## 2022-02-25 ENCOUNTER — APPOINTMENT (OUTPATIENT)
Dept: UROLOGY | Facility: CLINIC | Age: 72
End: 2022-02-25
Payer: MEDICARE

## 2022-02-25 VITALS
HEART RATE: 56 BPM | HEIGHT: 67 IN | BODY MASS INDEX: 30.92 KG/M2 | SYSTOLIC BLOOD PRESSURE: 157 MMHG | TEMPERATURE: 97.2 F | WEIGHT: 197 LBS | DIASTOLIC BLOOD PRESSURE: 82 MMHG

## 2022-02-25 PROCEDURE — 99213 OFFICE O/P EST LOW 20 MIN: CPT

## 2022-02-25 NOTE — HISTORY OF PRESENT ILLNESS
[FreeTextEntry1] : 10 months s/p rezum\par overall he is much better\par occasional urgency/urge incotinence\par happy with current symptoms\par PSA 12/2018 0.58\par remains on oxybutyinin 5

## 2023-02-24 ENCOUNTER — APPOINTMENT (OUTPATIENT)
Dept: UROLOGY | Facility: CLINIC | Age: 73
End: 2023-02-24

## 2023-03-28 ENCOUNTER — APPOINTMENT (OUTPATIENT)
Dept: VASCULAR SURGERY | Facility: CLINIC | Age: 73
End: 2023-03-28
Payer: MEDICARE

## 2023-03-28 VITALS
SYSTOLIC BLOOD PRESSURE: 102 MMHG | HEART RATE: 53 BPM | BODY MASS INDEX: 29.03 KG/M2 | WEIGHT: 185 LBS | HEIGHT: 67 IN | DIASTOLIC BLOOD PRESSURE: 60 MMHG

## 2023-03-28 DIAGNOSIS — I70.219 ATHEROSCLEROSIS OF NATIVE ARTERIES OF EXTREMITIES WITH INTERMITTENT CLAUDICATION, UNSPECIFIED EXTREMITY: ICD-10-CM

## 2023-03-28 PROCEDURE — 93923 UPR/LXTR ART STDY 3+ LVLS: CPT

## 2023-03-28 PROCEDURE — 99204 OFFICE O/P NEW MOD 45 MIN: CPT

## 2023-03-28 RX ORDER — PSEUDOEPHEDRINE HCL 30 MG/1
30 TABLET, FILM COATED ORAL
Qty: 32 | Refills: 0 | Status: DISCONTINUED | COMMUNITY
Start: 2017-07-20 | End: 2023-03-28

## 2023-03-28 RX ORDER — IBUPROFEN 600 MG/1
600 TABLET, FILM COATED ORAL
Qty: 20 | Refills: 0 | Status: DISCONTINUED | COMMUNITY
Start: 2017-12-20 | End: 2023-03-28

## 2023-03-28 RX ORDER — NAPROXEN 500 MG/1
500 TABLET ORAL
Qty: 60 | Refills: 0 | Status: DISCONTINUED | COMMUNITY
Start: 2017-07-13 | End: 2023-03-28

## 2023-03-28 RX ORDER — AMMONIUM LACTATE 12 %
12 CREAM (GRAM) TOPICAL
Qty: 280 | Refills: 0 | Status: DISCONTINUED | COMMUNITY
Start: 2018-04-10 | End: 2023-03-28

## 2023-03-28 RX ORDER — RANOLAZINE 1000 MG/1
1000 TABLET, FILM COATED, EXTENDED RELEASE ORAL
Qty: 60 | Refills: 0 | Status: DISCONTINUED | COMMUNITY
Start: 2018-05-11 | End: 2023-03-28

## 2023-03-28 RX ORDER — NAPROXEN 500 MG/1
500 TABLET ORAL
Refills: 0 | Status: DISCONTINUED | COMMUNITY
End: 2023-03-28

## 2023-03-28 RX ORDER — TADALAFIL 20 MG/1
20 TABLET ORAL
Qty: 6 | Refills: 11 | Status: DISCONTINUED | COMMUNITY
Start: 2021-01-08 | End: 2023-03-28

## 2023-03-28 RX ORDER — CEPHALEXIN 500 MG/1
500 CAPSULE ORAL
Qty: 28 | Refills: 0 | Status: DISCONTINUED | COMMUNITY
Start: 2018-04-05 | End: 2023-03-28

## 2023-03-28 RX ORDER — ROSUVASTATIN CALCIUM 40 MG/1
40 TABLET, FILM COATED ORAL
Qty: 30 | Refills: 0 | Status: DISCONTINUED | COMMUNITY
Start: 2018-05-11 | End: 2023-03-28

## 2023-03-28 RX ORDER — AMOXICILLIN 500 MG/1
500 CAPSULE ORAL
Qty: 21 | Refills: 0 | Status: DISCONTINUED | COMMUNITY
Start: 2017-12-20 | End: 2023-03-28

## 2023-03-28 RX ORDER — CLOTRIMAZOLE 10 MG/ML
1 SOLUTION TOPICAL
Qty: 60 | Refills: 0 | Status: DISCONTINUED | COMMUNITY
Start: 2018-04-10 | End: 2023-03-28

## 2023-03-28 RX ORDER — OMEPRAZOLE 40 MG/1
40 CAPSULE, DELAYED RELEASE ORAL
Qty: 60 | Refills: 5 | Status: DISCONTINUED | COMMUNITY
Start: 2018-06-05 | End: 2023-03-28

## 2023-03-28 RX ORDER — SIMVASTATIN 40 MG/1
40 TABLET, FILM COATED ORAL
Qty: 30 | Refills: 0 | Status: DISCONTINUED | COMMUNITY
Start: 2017-07-13 | End: 2023-03-28

## 2023-03-28 RX ORDER — OXYBUTYNIN CHLORIDE 5 MG/1
5 TABLET, EXTENDED RELEASE ORAL
Qty: 90 | Refills: 3 | Status: DISCONTINUED | COMMUNITY
Start: 2021-08-27 | End: 2023-03-28

## 2023-03-28 RX ORDER — ECONAZOLE NITRATE 10 MG/G
1 CREAM TOPICAL
Qty: 85 | Refills: 0 | Status: DISCONTINUED | COMMUNITY
Start: 2017-07-13 | End: 2023-03-28

## 2023-03-28 RX ORDER — METOPROLOL SUCCINATE 50 MG/1
50 TABLET, EXTENDED RELEASE ORAL
Qty: 30 | Refills: 0 | Status: DISCONTINUED | COMMUNITY
Start: 2017-05-23 | End: 2023-03-28

## 2023-03-28 RX ORDER — HYDROCODONE BITARTRATE AND ACETAMINOPHEN 7.5; 3 MG/1; MG/1
7.5-3 TABLET ORAL
Qty: 15 | Refills: 0 | Status: DISCONTINUED | COMMUNITY
Start: 2017-09-18 | End: 2023-03-28

## 2023-03-29 PROBLEM — I70.219 ATHEROSCLEROSIS OF ARTERY OF EXTREMITY WITH INTERMITTENT CLAUDICATION: Status: ACTIVE | Noted: 2023-03-29

## 2023-03-29 RX ORDER — HYDROXYCHLOROQUINE SULFATE 200 MG/1
200 TABLET, FILM COATED ORAL
Refills: 0 | Status: ACTIVE | COMMUNITY

## 2023-03-29 RX ORDER — LATANOPROST/PF 0.005 %
0.01 DROPS OPHTHALMIC (EYE)
Refills: 0 | Status: ACTIVE | COMMUNITY

## 2023-03-29 RX ORDER — AMLODIPINE BESYLATE AND BENAZEPRIL HYDROCHLORIDE 5; 20 MG/1; MG/1
5-20 CAPSULE ORAL
Refills: 0 | Status: ACTIVE | COMMUNITY

## 2023-03-29 RX ORDER — MELOXICAM 15 MG/1
15 TABLET ORAL
Refills: 0 | Status: ACTIVE | COMMUNITY

## 2023-03-29 RX ORDER — ATORVASTATIN CALCIUM 40 MG/1
40 TABLET, FILM COATED ORAL
Refills: 0 | Status: ACTIVE | COMMUNITY

## 2023-03-29 RX ORDER — ALFUZOSIN HYDROCHLORIDE 10 MG/1
10 TABLET, EXTENDED RELEASE ORAL
Refills: 0 | Status: ACTIVE | COMMUNITY

## 2023-04-03 NOTE — ADDENDUM
[FreeTextEntry1] : This note was written by Mike Guevara, acting as a scribe for Dr. Serafin Leo.  I, Dr. Serafin Leo, have read and attest that all the information, medical decision-making, and discharge instructions within are true and accurate.\par \par I, Dr. Serafin Leo, personally performed the evaluation and management (E/M) services for this new patient.  That E/M includes conducting the initial examination, assessing all conditions, and establishing the plan of care.  Today, my ACP, Mike Guevara, was here to observe my evaluation and management services for this patient to be followed going forward.

## 2023-04-03 NOTE — PROCEDURE
[FreeTextEntry1] : KYLAH/PVRs performed to evaluate b/l LEs for perfusion reveals pulsatile waveforms but dampened in the L lower leg, ABIs 0.68/1.23.

## 2023-04-03 NOTE — ASSESSMENT
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  Pt was a former smoker but quit years prior, states that his leg pains have only started in the past year.  He has started swimming regularly which does not cause symptoms in his legs, but which causes pain in his shoulders.  He has been following w/PT for low back symptoms and improved ROM and strength.\par \par Legs well-perfused on exam w/o any evidence of ischemia, no wounds.  KYLAH/PVRs performed to evaluate b/l LEs for perfusion reveals pulsatile waveforms but dampened in the L lower leg, ABIs 0.68/1.23.  Discussed options for treatment of his RLE claudication, including RLE angiogram w/intervention.  Pt is eager to treat his symptoms as it has been more difficult for him to travel.

## 2023-04-03 NOTE — PHYSICAL EXAM
[Normal Thyroid] : the thyroid was normal [Normal Breath Sounds] : Normal breath sounds [Respiratory Effort] : normal respiratory effort [2+] : right 2+ [1+] : right 1+ [0] : left 0 [No HSM] : no hepatosplenomegaly [No Rash or Lesion] : No rash or lesion [Alert] : alert [Calm] : calm [JVD] : no jugular venous distention  [Carotid Bruits] : no carotid bruits [Right Carotid Bruit] : no bruit heard over the right carotid [Left Carotid Bruit] : no bruit heard over the left carotid [Ankle Swelling (On Exam)] : not present [Varicose Veins Of Lower Extremities] : not present [] : not present [Abdomen Masses] : No abdominal masses [Abdomen Tenderness] : ~T ~M No abdominal tenderness [Purpura] : no purpura  [Petechiae] : no petechiae [Skin Ulcer] : no ulcer [Skin Induration] : no induration [de-identified] : Well-nourished, NAD [de-identified] : NC/AT, anicteric [de-identified] : FROM throughout, strength 5/5x4, no palpable cords in LEs, no muscle atrophy noted

## 2023-04-03 NOTE — HISTORY OF PRESENT ILLNESS
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  Pt was a former smoker but quit years prior, states that his leg pains have only started in the past year.  He has started swimming regularly which does not cause symptoms in his legs, but which causes pain in his shoulders.  He has been following w/PT for low back symptoms and improved ROM and strength.\par \par Pt denies rest pain in the feet/toes and does not need to hang his legs down for relief.  He is "borderline diabetic", but denies any wounds in the feet/toes.\par \par PMHx significant for HTN, HLD, CAD s/p PTCA, denies any family h/o vascular disease.  He currently takes pregabalin, prednisone for his shoulders, and ASA/Plavix since having his coronary stents placed in 2020.

## 2023-04-04 ENCOUNTER — APPOINTMENT (OUTPATIENT)
Dept: UROLOGY | Facility: CLINIC | Age: 73
End: 2023-04-04
Payer: MEDICARE

## 2023-04-04 VITALS — TEMPERATURE: 98 F | HEART RATE: 69 BPM | DIASTOLIC BLOOD PRESSURE: 85 MMHG | SYSTOLIC BLOOD PRESSURE: 123 MMHG

## 2023-04-04 DIAGNOSIS — N40.1 BENIGN PROSTATIC HYPERPLASIA WITH LOWER URINARY TRACT SYMPMS: ICD-10-CM

## 2023-04-04 DIAGNOSIS — R35.1 BENIGN PROSTATIC HYPERPLASIA WITH LOWER URINARY TRACT SYMPMS: ICD-10-CM

## 2023-04-04 PROCEDURE — 51798 US URINE CAPACITY MEASURE: CPT

## 2023-04-04 PROCEDURE — 99214 OFFICE O/P EST MOD 30 MIN: CPT | Mod: 25

## 2023-04-04 RX ORDER — OXYBUTYNIN CHLORIDE 10 MG/1
10 TABLET, EXTENDED RELEASE ORAL
Qty: 30 | Refills: 11 | Status: ACTIVE | COMMUNITY
Start: 2021-05-28 | End: 1900-01-01

## 2023-04-04 NOTE — HISTORY OF PRESENT ILLNESS
[FreeTextEntry1] : 73 yo. BPH s/p rezum 04/2021.\par Returns for follow up.\par \par Taking oxybutynin 5 mg\par Good FOS.\par Nocturia x 3.\par Feels complete emptying\par Urgency, sometimes leaks.\par No frequency.\par No hematuria, no dysuria.\par No fever or chills.\par \par Non smoker.\par No other complaint besides this. \par No fmaily hx of prostate cancer.\par

## 2023-04-04 NOTE — ASSESSMENT
[FreeTextEntry1] : BPH\par increase oxybutynin 10 mg \par UA UCx\par PVR r/o retention= 44ml\par f/u 2 month

## 2023-06-16 ENCOUNTER — APPOINTMENT (OUTPATIENT)
Dept: UROLOGY | Facility: CLINIC | Age: 73
End: 2023-06-16

## 2023-08-18 ENCOUNTER — APPOINTMENT (OUTPATIENT)
Dept: VASCULAR SURGERY | Facility: CLINIC | Age: 73
End: 2023-08-18
Payer: MEDICARE

## 2023-08-18 PROCEDURE — 93931 UPPER EXTREMITY STUDY: CPT | Mod: LT

## 2024-09-10 ENCOUNTER — APPOINTMENT (OUTPATIENT)
Dept: UROLOGY | Facility: CLINIC | Age: 74
End: 2024-09-10
Payer: MEDICARE

## 2024-09-10 VITALS
SYSTOLIC BLOOD PRESSURE: 74 MMHG | TEMPERATURE: 97.9 F | DIASTOLIC BLOOD PRESSURE: 44 MMHG | WEIGHT: 185 LBS | HEIGHT: 67 IN | BODY MASS INDEX: 29.03 KG/M2

## 2024-09-10 DIAGNOSIS — N32.81 OVERACTIVE BLADDER: ICD-10-CM

## 2024-09-10 DIAGNOSIS — N52.9 MALE ERECTILE DYSFUNCTION, UNSPECIFIED: ICD-10-CM

## 2024-09-10 PROCEDURE — 51798 US URINE CAPACITY MEASURE: CPT

## 2024-09-10 PROCEDURE — 99214 OFFICE O/P EST MOD 30 MIN: CPT

## 2024-09-10 PROCEDURE — G2211 COMPLEX E/M VISIT ADD ON: CPT

## 2024-09-10 RX ORDER — TADALAFIL 20 MG/1
20 TABLET ORAL
Qty: 90 | Refills: 0 | Status: ACTIVE | COMMUNITY
Start: 2024-09-10 | End: 1900-01-01

## 2024-09-10 RX ORDER — MIRABEGRON 50 MG/1
50 TABLET, EXTENDED RELEASE ORAL
Qty: 30 | Refills: 11 | Status: ACTIVE | COMMUNITY
Start: 2024-09-10 | End: 1900-01-01

## 2024-09-11 NOTE — END OF VISIT
[FreeTextEntry3] : I, Dr. Abdi, personally performed the evaluation and management (E/M) services for this established patient who presents today with (a) new problem(s)/exacerbation of (an) existing condition(s). That E/M includes conducting the clinically appropriate interval history &/or exam, assessing all new/exacerbated conditions, and establishing a new plan of care. Today, my ESTIVEN, Osvaldo Diaz, was here to observe my evaluation and management service for this new problem/exacerbated condition and follow the plan of care established by me going forward

## 2024-09-11 NOTE — ASSESSMENT
Talked with Mrs. Saldana regarding chest x ray. Per Dr. Jenkins's recommendations I explained to her to start taking 40 mg lasix for 3 days then to go down to 20 mg daily. I repeated this several times until she could repeat it back to me. I then informed her to get blood work by Friday. She expressed understanding.    [FreeTextEntry1] : 73M here for follow up with BPH s/p Rezum and ED  BPH  - DC oxybutynin  - trial myrbetriq 50 mg - UA UCx today  - PSA today  - PVR to r/o retention 137 cc   ED  - trial tadalafil 20 mg

## 2024-09-11 NOTE — HISTORY OF PRESENT ILLNESS
[FreeTextEntry1] : 73M here for follow up with BPH s/p Rezum and ED  - patient using oxybutynin 10 mg to control urge - reports occasional incontinence, and incomplete voiding  - also reports dry mouth and constipation  - no family hx prostate ca  - reports ED. hx of PDE5-i use with success. prefers Cialis - no cardiac event hx    -Last PSA 2018 0.58 - previous PVR 44 ml

## 2024-09-11 NOTE — ASSESSMENT
[FreeTextEntry1] : 73M here for follow up with BPH s/p Rezum and ED  BPH  - DC oxybutynin  - trial myrbetriq 50 mg - UA UCx today  - PSA today  - PVR to r/o retention 137 cc   ED  - trial tadalafil 20 mg

## 2024-09-13 ENCOUNTER — NON-APPOINTMENT (OUTPATIENT)
Age: 74
End: 2024-09-13

## 2024-09-13 DIAGNOSIS — N40.1 BENIGN PROSTATIC HYPERPLASIA WITH LOWER URINARY TRACT SYMPMS: ICD-10-CM

## 2024-09-13 DIAGNOSIS — R35.1 BENIGN PROSTATIC HYPERPLASIA WITH LOWER URINARY TRACT SYMPMS: ICD-10-CM

## 2024-09-13 LAB
APPEARANCE: CLEAR
BACTERIA UR CULT: ABNORMAL
BACTERIA: ABNORMAL /HPF
BILIRUBIN URINE: NEGATIVE
BLOOD URINE: NEGATIVE
CAST: 0 /LPF
COLOR: YELLOW
EPITHELIAL CELLS: 2 /HPF
GLUCOSE QUALITATIVE U: NEGATIVE MG/DL
KETONES URINE: NEGATIVE MG/DL
LEUKOCYTE ESTERASE URINE: ABNORMAL
MICROSCOPIC-UA: NORMAL
NITRITE URINE: POSITIVE
PH URINE: 6
PROTEIN URINE: NEGATIVE MG/DL
PSA FREE FLD-MCNC: 13 %
PSA FREE SERPL-MCNC: 0.42 NG/ML
PSA SERPL-MCNC: 3.26 NG/ML
RED BLOOD CELLS URINE: 0 /HPF
SPECIFIC GRAVITY URINE: 1.01
UROBILINOGEN URINE: 0.2 MG/DL
WHITE BLOOD CELLS URINE: 3 /HPF

## 2024-09-13 RX ORDER — AMOXICILLIN AND CLAVULANATE POTASSIUM 875; 125 MG/1; MG/1
875-125 TABLET, COATED ORAL
Qty: 14 | Refills: 0 | Status: ACTIVE | COMMUNITY
Start: 2024-09-13 | End: 1900-01-01

## 2025-02-05 VITALS
RESPIRATION RATE: 16 BRPM | WEIGHT: 184.97 LBS | TEMPERATURE: 98 F | DIASTOLIC BLOOD PRESSURE: 103 MMHG | HEART RATE: 58 BPM | HEIGHT: 67 IN | SYSTOLIC BLOOD PRESSURE: 163 MMHG | OXYGEN SATURATION: 98 %

## 2025-02-05 RX ORDER — ANTISEPTIC SURGICAL SCRUB 0.04 MG/ML
1 SOLUTION TOPICAL ONCE
Refills: 0 | Status: DISCONTINUED | OUTPATIENT
Start: 2025-02-10 | End: 2025-02-24

## 2025-02-05 NOTE — H&P ADULT - NSHPLABSRESULTS_GEN_ALL_CORE
egfr13.9   5.06  )-----------( 216      ( 10 Feb 2025 06:54 )             42.6       02-10    141  |  103  |  19  ----------------------------<  120[H]  4.4   |  28  |  1.13    Ca    9.0      10 Feb 2025 06:55  Mg     2.1     02-10        PT/INR - ( 10 Feb 2025 06:54 )   PT: 11.0 sec;   INR: 0.96          PTT - ( 10 Feb 2025 06:54 )  PTT:38.5 sec    CARDIAC MARKERS ( 10 Feb 2025 06:55 )  x     / x     / x     / x     / 3.8 ng/mL        Urinalysis Basic - ( 10 Feb 2025 06:55 )    Color: x / Appearance: x / SG: x / pH: x  Gluc: 120 mg/dL / Ketone: x  / Bili: x / Urobili: x   Blood: x / Protein: x / Nitrite: x   Leuk Esterase: x / RBC: x / WBC x   Sq Epi: x / Non Sq Epi: x / Bacteria: x        EKG: 13.9   5.06  )-----------( 216      ( 10 Feb 2025 06:54 )             42.6       02-10    141  |  103  |  19  ----------------------------<  120[H]  4.4   |  28  |  1.13    Ca    9.0      10 Feb 2025 06:55  Mg     2.1     02-10      PT/INR - ( 10 Feb 2025 06:54 )   PT: 11.0 sec;   INR: 0.96          PTT - ( 10 Feb 2025 06:54 )  PTT:38.5 sec    CARDIAC MARKERS ( 10 Feb 2025 06:55 )  x     / x     / x     / x     / 3.8 ng/mL      Urinalysis Basic - ( 10 Feb 2025 06:55 )    Color: x / Appearance: x / SG: x / pH: x  Gluc: 120 mg/dL / Ketone: x  / Bili: x / Urobili: x   Blood: x / Protein: x / Nitrite: x   Leuk Esterase: x / RBC: x / WBC x   Sq Epi: x / Non Sq Epi: x / Bacteria: x      EKG: 13.9   5.06  )-----------( 216      ( 10 Feb 2025 06:54 )             42.6       02-10    141  |  103  |  19  ----------------------------<  120[H]  4.4   |  28  |  1.13    Ca    9.0      10 Feb 2025 06:55  Mg     2.1     02-10      PT/INR - ( 10 Feb 2025 06:54 )   PT: 11.0 sec;   INR: 0.96          PTT - ( 10 Feb 2025 06:54 )  PTT:38.5 sec    CARDIAC MARKERS ( 10 Feb 2025 06:55 )  x     / x     / x     / x     / 3.8 ng/mL      Urinalysis Basic - ( 10 Feb 2025 06:55 )    Color: x / Appearance: x / SG: x / pH: x  Gluc: 120 mg/dL / Ketone: x  / Bili: x / Urobili: x   Blood: x / Protein: x / Nitrite: x   Leuk Esterase: x / RBC: x / WBC x   Sq Epi: x / Non Sq Epi: x / Bacteria: x      EKG: sinus bradycardia, HR 58. 1st degree AV block ()

## 2025-02-05 NOTE — H&P ADULT - ASSESSMENT
74 year old M with PMHX of HTN, CAD (PCI of RCA 10/2019), HLD, pre-DM, 1st degree AVB (on prior EKGs) presented to outpatient cardiologist, Dr. Brewer, c/o fatigue and decreased ET x several weeks, who now presents to [facility] for recommended cardiac catheterization with possible intervention if clinically indicated in light of pts risk factors, CCS class II-III anginal symptoms and abnormal NST.    - ASA II Mallampati I  - VSS  - Patient is a candidate for moderate sedation  - Labs reviewed by PA - H/H 13.9/42.6   K 4.4   Mg 2.1  - GFR/Cr 68/1.13  - EKG - _______________  - LOAD -  mg, Plavix 600 mg. Patient takes ASA 81 mg at home but has missed a dose during the past week  - FLUIDS -  ml bolus given x1; NS 75 ml/hr given x 2hr     Risks & benefits of procedure and alternative therapy have been explained to the patient including but not limited to: allergic reaction, bleeding w/possible need for blood transfusion, infection, renal and vascular compromise, limb damage, arrhythmia, stroke, vessel dissection/perforation, Myocardial infarction, emergent CABG. Informed consent obtained and in chart.    74 year old M with PMHX of HTN, CAD (PCI of RCA 10/2019), HLD, pre-DM, 1st degree AVB (on prior EKGs) presented to outpatient cardiologist, Dr. Brewer, c/o fatigue and decreased ET x several weeks, who now presents to [facility] for recommended cardiac catheterization with possible intervention if clinically indicated in light of pts risk factors, CCS class II-III anginal symptoms and abnormal NST.    - ASA II Mallampati I  - VSS  - Patient is a candidate for moderate sedation  - Labs reviewed by PA - H/H 13.9/42.6   K 4.4   Mg 2.1  - GFR/Cr 68/1.13  - EKG - sinus bradycardia, HR 58. 1st degree AV block ()  - LOAD -  mg, Plavix 600 mg. Patient takes ASA 81 mg at home but has missed a dose during the past week  - FLUIDS -  ml bolus given x1; NS 75 ml/hr given x 2hr     Risks & benefits of procedure and alternative therapy have been explained to the patient including but not limited to: allergic reaction, bleeding w/possible need for blood transfusion, infection, renal and vascular compromise, limb damage, arrhythmia, stroke, vessel dissection/perforation, Myocardial infarction, emergent CABG. Informed consent obtained and in chart.

## 2025-02-05 NOTE — H&P ADULT - HISTORY OF PRESENT ILLNESS
Cardio: Dr. Brewer   Pharmacy:    Escort:     74 year old M with PMHX of HTN, CAD (PCI of RCA 10/2019), HLD, pre-DM, 1st degree AVB (on prior EKGs) presented to outpatient cardiologist, Dr. Brewer, c/o fatigue and decreased ET x several weeks. Pt reports symptom onset was shortly after his sisters passing. Pt denies dizziness, palpitations, orthopnea/PND, leg swelling, LOC, bleeding, melena/hematochezia, fever, chills, URI symptoms, or recent illness.      TTE (01/15/2025): LVEF 60%, no RWMA, mod AS.   Exercise stress test (1/22/2025): symptomatic, abnormal stress test by EKG criteria.     In light of pts risk factors, CCS class _ anginal symptoms and abnormal NST, pt now presents to [facility] for recommended cardiac catheterization with possible intervention if clinically indicated.          CATH Hx:  Prior Cath (10/2019): LVEDP 18, EF 60%, LAD 40%, LCx 50%, pRCA 80% PTCA, dRCA 80%  Cardio: Dr. Brewer   Pharmacy:  [facility]    Escort: wife Mayte    74 year old M with PMHX of HTN, CAD (PCI of RCA 10/2019), HLD, pre-DM, 1st degree AVB (on prior EKGs) presented to outpatient cardiologist, Dr. Brewer, c/o fatigue and decreased ET x several weeks. Pt reports symptom onset was shortly after his sisters passing. Pt swims often and begins to experience chest pain when swimming, however he states after a while the pain resolves. The CP on exertion has been intermittent over the past 2 years Pt denies dizziness, palpitations, orthopnea/PND, leg swelling, LOC, bleeding, melena/hematochezia, fever, chills, URI symptoms, or recent illness.      TTE (01/15/2025): LVEF 60%, no RWMA, mod AS.   Exercise stress test (1/22/2025): symptomatic, abnormal stress test by EKG criteria.     In light of pts risk factors, CCS class II-III anginal symptoms and abnormal NST, pt now presents to [facility] for recommended cardiac catheterization with possible intervention if clinically indicated.      CATH Hx:  Prior Cath (10/2019): LVEDP 18, EF 60%, LAD 40%, LCx 50%, pRCA 80% PTCA, dRCA 80%  Cardio: Dr. Brewer   Ph[facility]acy: [facility]    Escort: wife Mayte    74 year old M with PMHX of HTN, CAD (PCI of RCA 10/2019), HLD, pre-DM, 1st degree AVB (on prior EKGs) presented to outpatient cardiologist, Dr. Brewer, c/o fatigue and decreased ET x several weeks. Pt reports symptom onset was shortly after his sisters passing. Pt also reports intermittent chest pain on exertion over the past 2 years. He swims often and begins to experience chest pain when swimming, however he states that he will continue swimming despite the pain and it eventually resolves. Pt states about 4-5 months ago he had a syncopal episode at the gym after being in the sauna. He also reports occasional dizziness when walking that alleviates quickly after starting. Pt denies palpitations, orthopnea/PND, leg swelling, bleeding, melena/hematochezia, fever, chills, URI symptoms, or recent illness.      TTE (01/15/2025): LVEF 60%, no RWMA, mod AS.   Exercise stress test (1/22/2025): symptomatic, abnormal stress test by EKG criteria.     In light of pts risk factors, CCS class II-III anginal symptoms and abnormal NST, pt now presents to [facility] for recommended cardiac catheterization with possible intervention if clinically indicated.      PRIOR CATH Hx:  [facility] (10/2019): LVEDP 18, EF 60%, LAD 40%, LCx 50%, pRCA 80% PTCA, dRCA 80%

## 2025-02-05 NOTE — H&P ADULT - NSICDXPASTMEDICALHX_GEN_ALL_CORE_FT
PAST MEDICAL HISTORY:  BPH (benign prostatic hyperplasia)     Glaucoma     Hyperlipidemia     Hypertension     Pre-diabetes

## 2025-02-10 ENCOUNTER — OUTPATIENT (OUTPATIENT)
Dept: OUTPATIENT SERVICES | Facility: HOSPITAL | Age: 75
LOS: 1 days | End: 2025-02-10
Payer: MEDICARE

## 2025-02-10 DIAGNOSIS — Z98.890 OTHER SPECIFIED POSTPROCEDURAL STATES: Chronic | ICD-10-CM

## 2025-02-10 LAB
A1C WITH ESTIMATED AVERAGE GLUCOSE RESULT: 6.1 % — HIGH (ref 4–5.6)
ANION GAP SERPL CALC-SCNC: 10 MMOL/L — SIGNIFICANT CHANGE UP (ref 5–17)
APTT BLD: 38.5 SEC — HIGH (ref 24.5–35.6)
BASOPHILS # BLD AUTO: 0.05 K/UL — SIGNIFICANT CHANGE UP (ref 0–0.2)
BASOPHILS NFR BLD AUTO: 1 % — SIGNIFICANT CHANGE UP (ref 0–2)
BUN SERPL-MCNC: 19 MG/DL — SIGNIFICANT CHANGE UP (ref 7–23)
CALCIUM SERPL-MCNC: 9 MG/DL — SIGNIFICANT CHANGE UP (ref 8.4–10.5)
CHLORIDE SERPL-SCNC: 103 MMOL/L — SIGNIFICANT CHANGE UP (ref 96–108)
CHOLEST SERPL-MCNC: 144 MG/DL — SIGNIFICANT CHANGE UP
CK MB CFR SERPL CALC: 3.8 NG/ML — SIGNIFICANT CHANGE UP (ref 0–6.7)
CK SERPL-CCNC: 150 U/L — SIGNIFICANT CHANGE UP (ref 30–200)
CO2 SERPL-SCNC: 28 MMOL/L — SIGNIFICANT CHANGE UP (ref 22–31)
CREAT SERPL-MCNC: 1.13 MG/DL — SIGNIFICANT CHANGE UP (ref 0.5–1.3)
EGFR: 68 ML/MIN/1.73M2 — SIGNIFICANT CHANGE UP
EOSINOPHIL # BLD AUTO: 0.49 K/UL — SIGNIFICANT CHANGE UP (ref 0–0.5)
EOSINOPHIL NFR BLD AUTO: 9.7 % — HIGH (ref 0–6)
ESTIMATED AVERAGE GLUCOSE: 128 MG/DL — HIGH (ref 68–114)
GLUCOSE SERPL-MCNC: 120 MG/DL — HIGH (ref 70–99)
HCT VFR BLD CALC: 42.6 % — SIGNIFICANT CHANGE UP (ref 39–50)
HDLC SERPL-MCNC: 60 MG/DL — SIGNIFICANT CHANGE UP
HGB BLD-MCNC: 13.9 G/DL — SIGNIFICANT CHANGE UP (ref 13–17)
IMM GRANULOCYTES NFR BLD AUTO: 0.4 % — SIGNIFICANT CHANGE UP (ref 0–0.9)
INR BLD: 0.96 — SIGNIFICANT CHANGE UP (ref 0.85–1.16)
LIPID PNL WITH DIRECT LDL SERPL: 72 MG/DL — SIGNIFICANT CHANGE UP
LYMPHOCYTES # BLD AUTO: 1.23 K/UL — SIGNIFICANT CHANGE UP (ref 1–3.3)
LYMPHOCYTES # BLD AUTO: 24.3 % — SIGNIFICANT CHANGE UP (ref 13–44)
MCHC RBC-ENTMCNC: 28.7 PG — SIGNIFICANT CHANGE UP (ref 27–34)
MCHC RBC-ENTMCNC: 32.6 G/DL — SIGNIFICANT CHANGE UP (ref 32–36)
MCV RBC AUTO: 88 FL — SIGNIFICANT CHANGE UP (ref 80–100)
MONOCYTES # BLD AUTO: 0.69 K/UL — SIGNIFICANT CHANGE UP (ref 0–0.9)
MONOCYTES NFR BLD AUTO: 13.6 % — SIGNIFICANT CHANGE UP (ref 2–14)
NEUTROPHILS # BLD AUTO: 2.58 K/UL — SIGNIFICANT CHANGE UP (ref 1.8–7.4)
NEUTROPHILS NFR BLD AUTO: 51 % — SIGNIFICANT CHANGE UP (ref 43–77)
NON HDL CHOLESTEROL: 84 MG/DL — SIGNIFICANT CHANGE UP
NRBC # BLD: 0 /100 WBCS — SIGNIFICANT CHANGE UP (ref 0–0)
NRBC BLD-RTO: 0 /100 WBCS — SIGNIFICANT CHANGE UP (ref 0–0)
PLATELET # BLD AUTO: 216 K/UL — SIGNIFICANT CHANGE UP (ref 150–400)
POTASSIUM SERPL-MCNC: 4.4 MMOL/L — SIGNIFICANT CHANGE UP (ref 3.5–5.3)
POTASSIUM SERPL-SCNC: 4.4 MMOL/L — SIGNIFICANT CHANGE UP (ref 3.5–5.3)
PROTHROM AB SERPL-ACNC: 11 SEC — SIGNIFICANT CHANGE UP (ref 9.9–13.4)
RBC # BLD: 4.84 M/UL — SIGNIFICANT CHANGE UP (ref 4.2–5.8)
RBC # FLD: 12.5 % — SIGNIFICANT CHANGE UP (ref 10.3–14.5)
SODIUM SERPL-SCNC: 141 MMOL/L — SIGNIFICANT CHANGE UP (ref 135–145)
TRIGL SERPL-MCNC: 54 MG/DL — SIGNIFICANT CHANGE UP
WBC # BLD: 5.06 K/UL — SIGNIFICANT CHANGE UP (ref 3.8–10.5)
WBC # FLD AUTO: 5.06 K/UL — SIGNIFICANT CHANGE UP (ref 3.8–10.5)

## 2025-02-10 PROCEDURE — 0523T NTRAPX C FFR W/3D FUNCJL MAP: CPT

## 2025-02-10 PROCEDURE — 93458 L HRT ARTERY/VENTRICLE ANGIO: CPT

## 2025-02-10 PROCEDURE — 80061 LIPID PANEL: CPT

## 2025-02-10 PROCEDURE — C1894: CPT

## 2025-02-10 PROCEDURE — 82550 ASSAY OF CK (CPK): CPT

## 2025-02-10 PROCEDURE — 75574 CT ANGIO HRT W/3D IMAGE: CPT | Mod: MC

## 2025-02-10 PROCEDURE — 75574 CT ANGIO HRT W/3D IMAGE: CPT | Mod: 26

## 2025-02-10 PROCEDURE — C1769: CPT

## 2025-02-10 PROCEDURE — 83036 HEMOGLOBIN GLYCOSYLATED A1C: CPT

## 2025-02-10 PROCEDURE — 85730 THROMBOPLASTIN TIME PARTIAL: CPT

## 2025-02-10 PROCEDURE — 99152 MOD SED SAME PHYS/QHP 5/>YRS: CPT

## 2025-02-10 PROCEDURE — 85025 COMPLETE CBC W/AUTO DIFF WBC: CPT

## 2025-02-10 PROCEDURE — 36415 COLL VENOUS BLD VENIPUNCTURE: CPT

## 2025-02-10 PROCEDURE — C1760: CPT

## 2025-02-10 PROCEDURE — 85610 PROTHROMBIN TIME: CPT

## 2025-02-10 PROCEDURE — 83735 ASSAY OF MAGNESIUM: CPT

## 2025-02-10 PROCEDURE — C1887: CPT

## 2025-02-10 PROCEDURE — 80048 BASIC METABOLIC PNL TOTAL CA: CPT

## 2025-02-10 PROCEDURE — 82553 CREATINE MB FRACTION: CPT

## 2025-02-10 PROCEDURE — 93010 ELECTROCARDIOGRAM REPORT: CPT | Mod: 59

## 2025-02-10 PROCEDURE — 93005 ELECTROCARDIOGRAM TRACING: CPT

## 2025-02-10 PROCEDURE — 93458 L HRT ARTERY/VENTRICLE ANGIO: CPT | Mod: 26

## 2025-02-10 RX ORDER — PREGABALIN CAPSULES, CV 225 MG/1
1 CAPSULE ORAL
Refills: 0 | DISCHARGE

## 2025-02-10 RX ORDER — ASPIRIN 81 MG/1
325 TABLET, COATED ORAL ONCE
Refills: 0 | Status: COMPLETED | OUTPATIENT
Start: 2025-02-10 | End: 2025-02-10

## 2025-02-10 RX ORDER — ASPIRIN 81 MG/1
81 TABLET, COATED ORAL ONCE
Refills: 0 | Status: DISCONTINUED | OUTPATIENT
Start: 2025-02-10 | End: 2025-02-10

## 2025-02-10 RX ORDER — ALFUZOSIN HYDROCHLORIDE 10 MG/1
1 TABLET, EXTENDED RELEASE ORAL
Refills: 0 | DISCHARGE

## 2025-02-10 RX ORDER — BACTERIOSTATIC SODIUM CHLORIDE 0.9 %
250 VIAL (ML) INJECTION ONCE
Refills: 0 | Status: COMPLETED | OUTPATIENT
Start: 2025-02-10 | End: 2025-02-10

## 2025-02-10 RX ORDER — BACTERIOSTATIC SODIUM CHLORIDE 0.9 %
1000 VIAL (ML) INJECTION
Refills: 0 | Status: DISCONTINUED | OUTPATIENT
Start: 2025-02-10 | End: 2025-02-10

## 2025-02-10 RX ORDER — BACTERIOSTATIC SODIUM CHLORIDE 0.9 %
1000 VIAL (ML) INJECTION
Refills: 0 | Status: DISCONTINUED | OUTPATIENT
Start: 2025-02-10 | End: 2025-02-24

## 2025-02-10 RX ADMIN — ASPIRIN 325 MILLIGRAM(S): 81 TABLET, COATED ORAL at 07:54

## 2025-02-10 RX ADMIN — Medication 50 MILLILITER(S): at 07:53

## 2025-02-10 RX ADMIN — Medication 500 MILLILITER(S): at 07:53

## 2025-02-10 RX ADMIN — Medication 600 MILLIGRAM(S): at 07:54

## 2025-02-10 NOTE — PROGRESS NOTE ADULT - SUBJECTIVE AND OBJECTIVE BOX
Interventional Cardiology  Post  Diagnostic Catheterization  Discharge Note      Patient without complaints. Ambulated and voided without difficulties    Afebrile, VSS    Ext:  Right Groin: No hematoma bruit, dressing; C/D/I  		      Pulses:    intact RAD/DP/PT to baseline     A/P:  74 year old M with PMHX of HTN, CAD (PCI of RCA 10/2019), HLD, pre-DM, 1st degree AVB (on prior EKGs) presented to outpatient cardiologist, Dr. Brewer, c/o fatigue and decreased ET x several weeks. Pt reports symptom onset was shortly after his sisters passing. Pt also reports intermittent chest pain on exertion over the past 2 years. He swims often and begins to experience chest pain when swimming, however he states that he will continue swimming despite the pain and it eventually resolves. Pt states about 4-5 months ago he had a syncopal episode at the gym after being in the sauna. He also reports occasional dizziness when walking that alleviates quickly after starting. Pt denies palpitations, orthopnea/PND, leg swelling, bleeding, melena/hematochezia, fever, chills, URI symptoms, or recent illness.      TTE (01/15/2025): LVEF 60%, no RWMA, mod AS.   Exercise stress test (1/22/2025): symptomatic, abnormal stress test by EKG criteria.     In light of pts risk factors, CCS class II-III anginal symptoms and abnormal NST, pt now presents to [facility] for recommended cardiac catheterization with possible intervention if clinically indicated.      PRIOR CATH Hx:  [facility] (10/2019): LVEDP 18, EF 60%, LAD 40%, LCx 50%, pRCA 80% PTCA, dRCA 80%     Patient now s/p Dx [facility] with Dr. Santana on 2/10/2025. Lm normal, pLCx 50% ( FFR 0.84), pLAD 40% (0.97) pRCA 80%. 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 Patient rec for CCTA prior to discharge for more information.     1.	Follow-up with Cardiologist Dr. Brewer in 1week.   2.          Pt given instructions on importance of post groin access site care. .    3. 	Stable for discharge as per attending Dr. Santana after CCTA, bed rest, pt voids, groin stable and 30 minutes of ambulation.        Interventional Cardiology  Post  Diagnostic Catheterization  Discharge Note      Patient without complaints. Ambulated and voided without difficulties    Afebrile, VSS    Ext:  Right Groin: No hematoma bruit, dressing; C/D/I  		      Pulses:    intact RAD/DP/PT to baseline     A/P:  74 year old M with PMHX of HTN, CAD (PCI of RCA 10/2019), HLD, pre-DM, 1st degree AVB (on prior EKGs) presented to outpatient cardiologist, Dr. Brewer, c/o fatigue and decreased ET x several weeks. Pt reports symptom onset was shortly after his sisters passing. Pt also reports intermittent chest pain on exertion over the past 2 years. He swims often and begins to experience chest pain when swimming, however he states that he will continue swimming despite the pain and it eventually resolves. Pt states about 4-5 months ago he had a syncopal episode at the gym after being in the sauna. He also reports occasional dizziness when walking that alleviates quickly after starting. Pt denies palpitations, orthopnea/PND, leg swelling, bleeding, melena/hematochezia, fever, chills, URI symptoms, or recent illness.      TTE (01/15/2025): LVEF 60%, no RWMA, mod AS.   Exercise stress test (1/22/2025): symptomatic, abnormal stress test by EKG criteria.     In light of pts risk factors, CCS class II-III anginal symptoms and abnormal NST, pt now presents to [facility] for recommended cardiac catheterization with possible intervention if clinically indicated.      PRIOR CATH Hx:  [facility] (10/2019): LVEDP 18, EF 60%, LAD 40%, LCx 50%, pRCA 80% PTCA, dRCA 80%     Patient now s/p Dx [facility] with Dr. Santana on 2/10/2025. Lm normal, pLCx 50% ( FFR 0.84), pLAD 40% (0.97) pRCA 80%. 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 EDP 14. Patient rec for CCTA prior to discharge.    Attending/Fellow: Dr. Santana/Johanne    1.	Follow-up with Cardiologist Dr. Brewer in 1week.   2.          Pt given instructions on importance of post groin access site care. .    3. 	Stable for discharge as per attending Dr. Santana after CCTA, bed rest, pt voids, groin stable and 30 minutes of ambulation.

## 2025-05-09 ENCOUNTER — NON-APPOINTMENT (OUTPATIENT)
Age: 75
End: 2025-05-09

## 2025-06-04 ENCOUNTER — NON-APPOINTMENT (OUTPATIENT)
Age: 75
End: 2025-06-04

## 2025-06-04 ENCOUNTER — APPOINTMENT (OUTPATIENT)
Dept: CARDIOTHORACIC SURGERY | Facility: CLINIC | Age: 75
End: 2025-06-04
Payer: MEDICARE

## 2025-06-04 VITALS
HEART RATE: 63 BPM | OXYGEN SATURATION: 95 % | HEIGHT: 67 IN | SYSTOLIC BLOOD PRESSURE: 146 MMHG | WEIGHT: 192 LBS | DIASTOLIC BLOOD PRESSURE: 68 MMHG | BODY MASS INDEX: 30.13 KG/M2

## 2025-06-04 DIAGNOSIS — I35.0 NONRHEUMATIC AORTIC (VALVE) STENOSIS: ICD-10-CM

## 2025-06-04 DIAGNOSIS — I20.89 OTHER FORMS OF ANGINA PECTORIS: ICD-10-CM

## 2025-06-04 DIAGNOSIS — Q24.5 MALFORMATION OF CORONARY VESSELS: ICD-10-CM

## 2025-06-04 PROCEDURE — 99204 OFFICE O/P NEW MOD 45 MIN: CPT

## 2025-06-05 PROBLEM — I20.89 ANGINA AT REST: Status: ACTIVE | Noted: 2025-06-05

## 2025-06-05 PROBLEM — I35.0 AORTIC STENOSIS: Status: ACTIVE | Noted: 2025-06-05

## 2025-06-05 PROBLEM — Q24.5 ABERRANT CORONARY ARTERY: Status: ACTIVE | Noted: 2025-06-05

## 2025-06-05 RX ORDER — ALFUZOSIN HYDROCHLORIDE 10 MG/1
10 TABLET, EXTENDED RELEASE ORAL
Refills: 0 | Status: ACTIVE | COMMUNITY
Start: 2025-06-04

## 2025-06-05 RX ORDER — BRIMONIDINE TARTRATE 2 MG/MG
0.2 SOLUTION/ DROPS OPHTHALMIC
Refills: 0 | Status: ACTIVE | COMMUNITY
Start: 2025-06-04

## 2025-06-05 RX ORDER — LISINOPRIL 40 MG/1
40 TABLET ORAL
Refills: 0 | Status: ACTIVE | COMMUNITY
Start: 2025-06-04

## 2025-06-05 RX ORDER — PREGABALIN 50 MG/1
50 CAPSULE ORAL
Refills: 0 | Status: ACTIVE | COMMUNITY
Start: 2025-06-04

## 2025-06-18 ENCOUNTER — TRANSCRIPTION ENCOUNTER (OUTPATIENT)
Age: 75
End: 2025-06-18

## 2025-07-07 ENCOUNTER — RESULT REVIEW (OUTPATIENT)
Age: 75
End: 2025-07-07

## 2025-07-07 ENCOUNTER — NON-APPOINTMENT (OUTPATIENT)
Age: 75
End: 2025-07-07

## 2025-07-07 ENCOUNTER — OUTPATIENT (OUTPATIENT)
Dept: OUTPATIENT SERVICES | Facility: HOSPITAL | Age: 75
LOS: 1 days | End: 2025-07-07
Payer: MEDICARE

## 2025-07-07 DIAGNOSIS — Z98.890 OTHER SPECIFIED POSTPROCEDURAL STATES: Chronic | ICD-10-CM

## 2025-07-07 DIAGNOSIS — I35.0 NONRHEUMATIC AORTIC (VALVE) STENOSIS: ICD-10-CM

## 2025-07-07 DIAGNOSIS — I20.89 OTHER FORMS OF ANGINA PECTORIS: ICD-10-CM

## 2025-07-07 PROCEDURE — 93306 TTE W/DOPPLER COMPLETE: CPT | Mod: 26

## 2025-07-09 ENCOUNTER — OUTPATIENT (OUTPATIENT)
Dept: OUTPATIENT SERVICES | Facility: HOSPITAL | Age: 75
LOS: 1 days | End: 2025-07-09
Payer: MEDICARE

## 2025-07-09 ENCOUNTER — APPOINTMENT (OUTPATIENT)
Dept: ULTRASOUND IMAGING | Facility: HOSPITAL | Age: 75
End: 2025-07-09
Payer: MEDICARE

## 2025-07-09 ENCOUNTER — APPOINTMENT (OUTPATIENT)
Dept: CARDIOTHORACIC SURGERY | Facility: CLINIC | Age: 75
End: 2025-07-09
Payer: MEDICARE

## 2025-07-09 DIAGNOSIS — Z98.890 OTHER SPECIFIED POSTPROCEDURAL STATES: Chronic | ICD-10-CM

## 2025-07-09 PROCEDURE — 36415 COLL VENOUS BLD VENIPUNCTURE: CPT

## 2025-07-09 PROCEDURE — 93880 EXTRACRANIAL BILAT STUDY: CPT | Mod: 26

## 2025-07-09 PROCEDURE — 85610 PROTHROMBIN TIME: CPT

## 2025-07-09 PROCEDURE — 85730 THROMBOPLASTIN TIME PARTIAL: CPT

## 2025-07-09 PROCEDURE — 83036 HEMOGLOBIN GLYCOSYLATED A1C: CPT

## 2025-07-09 PROCEDURE — 80053 COMPREHEN METABOLIC PANEL: CPT

## 2025-07-09 PROCEDURE — 71046 X-RAY EXAM CHEST 2 VIEWS: CPT | Mod: 26

## 2025-07-09 PROCEDURE — 81001 URINALYSIS AUTO W/SCOPE: CPT

## 2025-07-09 PROCEDURE — 86850 RBC ANTIBODY SCREEN: CPT

## 2025-07-09 PROCEDURE — 86901 BLOOD TYPING SEROLOGIC RH(D): CPT

## 2025-07-09 PROCEDURE — 80061 LIPID PANEL: CPT

## 2025-07-09 PROCEDURE — 85025 COMPLETE CBC W/AUTO DIFF WBC: CPT

## 2025-07-09 PROCEDURE — 84443 ASSAY THYROID STIM HORMONE: CPT

## 2025-07-09 PROCEDURE — 86900 BLOOD TYPING SEROLOGIC ABO: CPT

## 2025-07-10 NOTE — H&P ADULT - NSICDXPASTMEDICALHX_GEN_ALL_CORE_FT
PAST MEDICAL HISTORY:  Anomalous right coronary artery     BPH (benign prostatic hyperplasia)     Glaucoma     Hyperlipidemia     Hypertension     Pre-diabetes

## 2025-07-10 NOTE — H&P ADULT - HISTORY OF PRESENT ILLNESS
74-year-old male with a past medical hx of CAD s/p PCI to RCA 3 years ago, RA, HTN, HLD, pre-DM, BPH, VIMAL, overactive bladder, who presents for evaluation and management of anomalous RCA and aortic stenosis. Patient reports mild CP at rest (2/10), however with exertion the CP increases 6/10. Also reports some MARIEE when walking one block that has been progressively worsening since 2019. Patient is referred by Heaven Brewer MD. **unable to stent coronary anomaly. CCS III. NYHA Class III. ?    CTA 2/10/2025  Cardiac:  1. 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  2. Unable to assess RCA for stenosis due to motion artifact.  3. Calcific plaque obscures the lumen left circumflex, OM1, OM2, D1, proximal LAD, mid and distal LAD, RCA precluding stenosis evaluation.  4. Remaining segments demonstrate non obstructive coronary artery disease.    Cardiac cath 2/17/2025  1. Right dominant circulation  2. 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  3. Cathworks FFR with 3D functional mapping of color-coded FFR of the LAD is negative 0.97  4. Cathworks FFR with 3D functional mapping of color-coded FFR of the LCX is negative 0.91  4. Hemostasis for right femoral/perclose    TTE 1/15/2025  normal EF. heavy aortic valve calcifications. Per my assessment, severity of aortic stenosis appears moderate. ascending aorta 4cm.    TTE 7/8/2025:   1. Normal left ventricular diastolic function, with normal left ventricular filling pressure.   2. Normal right ventricular cavity size, with normal wall thickness, and normal right ventricular systolic function.   3. Left atrium is moderately dilated.   4. Fibrocalcific aortic valve sclerosis without significant stenosis.   5. No echocardiographic evidence of pulmonary hypertension.   6. Estimated pulmonary artery systolic pressure is 25 mmHg.   7. No pericardial effusion seen.   8. Aortic root at the sinuses of Valsalva is normal in size, measuring 3.50 cm (indexed 1.73 cm/m²).   9. No prior echocardiogram is available for comparison.      Patient evaluated by CTS and is deemed a candidate for transposition of RCA. He presents for same day admission.     ?

## 2025-07-10 NOTE — H&P ADULT - NSHPPHYSICALEXAM_GEN_ALL_CORE
Constitutional: alert and in no acute distress.    Eyes: the sclera and conjunctiva were normal, pupils were equal in size, round, and reactive to light and extraocular movements were intact.    ENT: the ears and nose were normal in appearance . the oropharynx was normal.    Neck: the appearance of the neck was normal, no neck mass was observed, the thyroid was not enlarged and there were no palpable thyroid nodules . there was no jugular-venous distention.    Pulmonary: no respiratory distress and lungs were clear to auscultation bilaterally.    Heart:. + Systolic murmur over RUSB.    Chest: the chest was normal in appearance, no chest asymmetry and normal chest expansion.    Vascular: Carotid: right 2+, left 2+, no bruit heard over the right carotid and no bruit heard over the left carotid. Femoral: right 2+, left 2+, no bruit heard over the right femoral artery and no bruit heard over the left femoral artery. Posterior tibialis: right 2+ and left 2+. Dorsalis pedis: right 2+ and left 2+.    Abdominal Aorta: the abdominal aorta was not enlarged and no bruit was heard.    Breasts:. deferred.    Abdomen: normal bowel sounds, soft, non-tender, no hepato-splenomegaly and no abdominal mass palpated.    Genitourinary:. deferred.    Lymphatics: deferred.    Back: no costovertebral angle tenderness and no spinal tenderness.    Musculoskeletal: normal gait, no clubbing or cyanosis of the fingernails, no joint swelling seen and muscle strength and tone were normal.    Skin: normal skin color and pigmentation, normal skin turgor and no rash.    Neurological: deep tendon reflexes were 2+ and symmetric, the sensory exam was normal to light touch and pinprick and no focal deficits.    Psychiatric: oriented to person, place, and time, insight and judgment were intact and the affect was normal.

## 2025-07-10 NOTE — H&P ADULT - NSHPLABSRESULTS_GEN_ALL_CORE
< from: US Duplex Carotid Arteries Complete, Bilateral (07.09.25 @ 11:10) >    IMPRESSION: Plaque bilaterally with no significant hemodynamic stenosis   of either carotid artery.    < end of copied text >    < from: Xray Chest PA/Lat Pre Surgical Testing 2 Views (07.09.25 @ 09:20) >    Lungs clear. No infiltrate pleural effusion or pneumothorax. Mild   hypoinflation. No acute bone abnormality. Vascular calcification thoracic   aorta. Unremarkable cardiac silhouette.    < end of copied text >    < from: CT Angio Cardiac w/ IV Cont (02.10.25 @ 12:31) >    1.  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  2.  Unable to assess RCA for stenosis due to motion artifact.  3.  Calcific plaque obscures the lumen left circumflex, OM1, OM2, D1,   proximal LAD, mid and distal LAD, RCA precluding stenosis evaluation.  4.  Remaining segments demonstrate non obstructive coronary artery   disease.      < end of copied text >

## 2025-07-10 NOTE — H&P ADULT - ASSESSMENT
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 I have reviewed the indications for surgery, and used our webpage [email or web address] <[email or web address]> to illustrate the aorta and anatomy of the heart.  ?  I have discussed the indications for surgery with the patient. I have explained to the patient that untreated anomalous right coronary artery is a common cause of sudden cardiac arrest. I had a lengthy discussion with the patient regarding his coronary artery disease, aortic valve disease, and progression. I have recommended that the patient is a candidate for transposition of RCA.   ?  I have discussed the risks, benefits and alternatives to surgery. I have explained the risks of the surgery, including approximately 1% major mortality or morbidity including stroke, infection, bleeding, death, renal failure and heart attack. All questions were addressed.  ?  The patient was educated on various valve options: mechanical vs. biological valve. The patient has chosen a bioprosthesis.  ?  I have discussed the risks, benefits and alternatives to surgery. I have explained the risks of the surgery, including approximately 1% major mortality or morbidity including stroke, infection, bleeding, death, renal failure and heart attack. There is a 3% risk of requiring a PPM in patients with aortic valve intervention. All questions were addressed, and patient agrees to proceed with surgery.    -transposition of RCA. SDA 7/14/25.   -NPO past midnight.  -antibacterial soaps x 3 provided.  -vit c 2grams the night before sx.    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 I have reviewed the indications for surgery, and used our webpage [email or web address] <[email or web address]> to illustrate the aorta and anatomy of the heart.  ?  I have discussed the indications for surgery with the patient. I have explained to the patient that untreated anomalous right coronary artery is a common cause of sudden cardiac arrest. I had a lengthy discussion with the patient regarding his coronary artery disease, aortic valve disease, and progression. I have recommended that the patient is a candidate for transposition of RCA.   ?  I have discussed the risks, benefits and alternatives to surgery. I have explained the risks of the surgery, including approximately 1% major mortality or morbidity including stroke, infection, bleeding, death, renal failure and heart attack. All questions were addressed.  ?  The patient was educated on various valve options: mechanical vs. biological valve. The patient has chosen a bioprosthesis.  ?  I have discussed the risks, benefits and alternatives to surgery. I have explained the risks of the surgery, including approximately 1% major mortality or morbidity including stroke, infection, bleeding, death, renal failure and heart attack. There is a 3% risk of requiring a PPM in patients with aortic valve intervention. All questions were addressed, and patient agrees to proceed with surgery.    -transposition of RCA. SDA 7/14/25.   -NPO past midnight.  -antibacterial soaps x 3 provided.  -vit c 2grams the night before sx.   -positive Leukocyte noted on preop UA, pt asymptomatic, -- Augmentin 875-125mg BID started (used it last year for UTI from urologist).

## 2025-07-11 VITALS
HEIGHT: 60 IN | OXYGEN SATURATION: 94 % | DIASTOLIC BLOOD PRESSURE: 77 MMHG | RESPIRATION RATE: 16 BRPM | WEIGHT: 189.82 LBS | SYSTOLIC BLOOD PRESSURE: 178 MMHG | HEART RATE: 65 BPM | TEMPERATURE: 98 F

## 2025-07-14 ENCOUNTER — INPATIENT (INPATIENT)
Facility: HOSPITAL | Age: 75
LOS: 3 days | Discharge: HOME HEALTH | DRG: 236 | End: 2025-07-18
Attending: THORACIC SURGERY (CARDIOTHORACIC VASCULAR SURGERY) | Admitting: THORACIC SURGERY (CARDIOTHORACIC VASCULAR SURGERY)
Payer: MEDICARE

## 2025-07-14 ENCOUNTER — APPOINTMENT (OUTPATIENT)
Dept: CARDIOTHORACIC SURGERY | Facility: HOSPITAL | Age: 75
End: 2025-07-14

## 2025-07-14 ENCOUNTER — TRANSCRIPTION ENCOUNTER (OUTPATIENT)
Age: 75
End: 2025-07-14

## 2025-07-14 DIAGNOSIS — Z98.890 OTHER SPECIFIED POSTPROCEDURAL STATES: Chronic | ICD-10-CM

## 2025-07-14 LAB
ALBUMIN SERPL ELPH-MCNC: 4 G/DL — SIGNIFICANT CHANGE UP (ref 3.3–5)
ALBUMIN SERPL ELPH-MCNC: 4.2 G/DL — SIGNIFICANT CHANGE UP (ref 3.3–5)
ALP SERPL-CCNC: 62 U/L — SIGNIFICANT CHANGE UP (ref 40–120)
ALP SERPL-CCNC: 69 U/L — SIGNIFICANT CHANGE UP (ref 40–120)
ALT FLD-CCNC: 11 U/L — SIGNIFICANT CHANGE UP (ref 10–45)
ALT FLD-CCNC: 12 U/L — SIGNIFICANT CHANGE UP (ref 10–45)
ANION GAP SERPL CALC-SCNC: 16 MMOL/L — SIGNIFICANT CHANGE UP (ref 5–17)
ANION GAP SERPL CALC-SCNC: 9 MMOL/L — SIGNIFICANT CHANGE UP (ref 5–17)
APTT BLD: 31.5 SEC — SIGNIFICANT CHANGE UP (ref 26.1–36.8)
APTT BLD: 32.9 SEC — SIGNIFICANT CHANGE UP (ref 26.1–36.8)
AST SERPL-CCNC: 19 U/L — SIGNIFICANT CHANGE UP (ref 10–40)
AST SERPL-CCNC: 22 U/L — SIGNIFICANT CHANGE UP (ref 10–40)
BASOPHILS # BLD AUTO: 0.02 K/UL — SIGNIFICANT CHANGE UP (ref 0–0.2)
BASOPHILS # BLD AUTO: 0.03 K/UL — SIGNIFICANT CHANGE UP (ref 0–0.2)
BASOPHILS NFR BLD AUTO: 0.2 % — SIGNIFICANT CHANGE UP (ref 0–2)
BASOPHILS NFR BLD AUTO: 0.3 % — SIGNIFICANT CHANGE UP (ref 0–2)
BILIRUB SERPL-MCNC: 0.6 MG/DL — SIGNIFICANT CHANGE UP (ref 0.2–1.2)
BILIRUB SERPL-MCNC: 0.8 MG/DL — SIGNIFICANT CHANGE UP (ref 0.2–1.2)
BLD GP AB SCN SERPL QL: NEGATIVE — SIGNIFICANT CHANGE UP
BUN SERPL-MCNC: 19 MG/DL — SIGNIFICANT CHANGE UP (ref 7–23)
BUN SERPL-MCNC: 21 MG/DL — SIGNIFICANT CHANGE UP (ref 7–23)
CALCIUM SERPL-MCNC: 8.9 MG/DL — SIGNIFICANT CHANGE UP (ref 8.4–10.5)
CALCIUM SERPL-MCNC: 9 MG/DL — SIGNIFICANT CHANGE UP (ref 8.4–10.5)
CHLORIDE SERPL-SCNC: 103 MMOL/L — SIGNIFICANT CHANGE UP (ref 96–108)
CHLORIDE SERPL-SCNC: 105 MMOL/L — SIGNIFICANT CHANGE UP (ref 96–108)
CO2 SERPL-SCNC: 19 MMOL/L — LOW (ref 22–31)
CO2 SERPL-SCNC: 25 MMOL/L — SIGNIFICANT CHANGE UP (ref 22–31)
CREAT SERPL-MCNC: 0.95 MG/DL — SIGNIFICANT CHANGE UP (ref 0.5–1.3)
CREAT SERPL-MCNC: 1.04 MG/DL — SIGNIFICANT CHANGE UP (ref 0.5–1.3)
EGFR: 75 ML/MIN/1.73M2 — SIGNIFICANT CHANGE UP
EGFR: 75 ML/MIN/1.73M2 — SIGNIFICANT CHANGE UP
EGFR: 84 ML/MIN/1.73M2 — SIGNIFICANT CHANGE UP
EGFR: 84 ML/MIN/1.73M2 — SIGNIFICANT CHANGE UP
EOSINOPHIL # BLD AUTO: 0.03 K/UL — SIGNIFICANT CHANGE UP (ref 0–0.5)
EOSINOPHIL # BLD AUTO: 0.39 K/UL — SIGNIFICANT CHANGE UP (ref 0–0.5)
EOSINOPHIL NFR BLD AUTO: 0.2 % — SIGNIFICANT CHANGE UP (ref 0–6)
EOSINOPHIL NFR BLD AUTO: 3.8 % — SIGNIFICANT CHANGE UP (ref 0–6)
GAS PNL BLDA: SIGNIFICANT CHANGE UP
GLUCOSE SERPL-MCNC: 122 MG/DL — HIGH (ref 70–99)
GLUCOSE SERPL-MCNC: 156 MG/DL — HIGH (ref 70–99)
HCT VFR BLD CALC: 35.9 % — LOW (ref 39–50)
HCT VFR BLD CALC: 37.5 % — LOW (ref 39–50)
HGB BLD-MCNC: 11.9 G/DL — LOW (ref 13–17)
HGB BLD-MCNC: 12.6 G/DL — LOW (ref 13–17)
IMM GRANULOCYTES # BLD AUTO: 0.05 K/UL — SIGNIFICANT CHANGE UP (ref 0–0.07)
IMM GRANULOCYTES # BLD AUTO: 0.06 K/UL — SIGNIFICANT CHANGE UP (ref 0–0.07)
IMM GRANULOCYTES NFR BLD AUTO: 0.4 % — SIGNIFICANT CHANGE UP (ref 0–0.9)
IMM GRANULOCYTES NFR BLD AUTO: 0.6 % — SIGNIFICANT CHANGE UP (ref 0–0.9)
INR BLD: 1.09 — SIGNIFICANT CHANGE UP (ref 0.85–1.16)
INR BLD: 1.15 — SIGNIFICANT CHANGE UP (ref 0.85–1.16)
LACTATE SERPL-SCNC: 0.9 MMOL/L — SIGNIFICANT CHANGE UP (ref 0.5–2)
LYMPHOCYTES # BLD AUTO: 0.92 K/UL — LOW (ref 1–3.3)
LYMPHOCYTES # BLD AUTO: 1.23 K/UL — SIGNIFICANT CHANGE UP (ref 1–3.3)
LYMPHOCYTES NFR BLD AUTO: 12 % — LOW (ref 13–44)
LYMPHOCYTES NFR BLD AUTO: 7.4 % — LOW (ref 13–44)
MAGNESIUM SERPL-MCNC: 1.9 MG/DL — SIGNIFICANT CHANGE UP (ref 1.6–2.6)
MAGNESIUM SERPL-MCNC: 2.2 MG/DL — SIGNIFICANT CHANGE UP (ref 1.6–2.6)
MCHC RBC-ENTMCNC: 29.3 PG — SIGNIFICANT CHANGE UP (ref 27–34)
MCHC RBC-ENTMCNC: 29.5 PG — SIGNIFICANT CHANGE UP (ref 27–34)
MCHC RBC-ENTMCNC: 33.1 G/DL — SIGNIFICANT CHANGE UP (ref 32–36)
MCHC RBC-ENTMCNC: 33.6 G/DL — SIGNIFICANT CHANGE UP (ref 32–36)
MCV RBC AUTO: 87.2 FL — SIGNIFICANT CHANGE UP (ref 80–100)
MCV RBC AUTO: 89.1 FL — SIGNIFICANT CHANGE UP (ref 80–100)
MONOCYTES # BLD AUTO: 0.45 K/UL — SIGNIFICANT CHANGE UP (ref 0–0.9)
MONOCYTES # BLD AUTO: 0.8 K/UL — SIGNIFICANT CHANGE UP (ref 0–0.9)
MONOCYTES NFR BLD AUTO: 4.4 % — SIGNIFICANT CHANGE UP (ref 2–14)
MONOCYTES NFR BLD AUTO: 6.4 % — SIGNIFICANT CHANGE UP (ref 2–14)
NEUTROPHILS # BLD AUTO: 10.69 K/UL — HIGH (ref 1.8–7.4)
NEUTROPHILS # BLD AUTO: 8.11 K/UL — HIGH (ref 1.8–7.4)
NEUTROPHILS NFR BLD AUTO: 78.9 % — HIGH (ref 43–77)
NEUTROPHILS NFR BLD AUTO: 85.4 % — HIGH (ref 43–77)
NRBC # BLD AUTO: 0 K/UL — SIGNIFICANT CHANGE UP (ref 0–0)
NRBC # BLD AUTO: 0 K/UL — SIGNIFICANT CHANGE UP (ref 0–0)
NRBC # FLD: 0 K/UL — SIGNIFICANT CHANGE UP (ref 0–0)
NRBC # FLD: 0 K/UL — SIGNIFICANT CHANGE UP (ref 0–0)
NRBC BLD AUTO-RTO: 0 /100 WBCS — SIGNIFICANT CHANGE UP (ref 0–0)
NRBC BLD AUTO-RTO: 0 /100 WBCS — SIGNIFICANT CHANGE UP (ref 0–0)
PHOSPHATE SERPL-MCNC: 2.7 MG/DL — SIGNIFICANT CHANGE UP (ref 2.5–4.5)
PHOSPHATE SERPL-MCNC: 2.9 MG/DL — SIGNIFICANT CHANGE UP (ref 2.5–4.5)
PLATELET # BLD AUTO: 169 K/UL — SIGNIFICANT CHANGE UP (ref 150–400)
PLATELET # BLD AUTO: 184 K/UL — SIGNIFICANT CHANGE UP (ref 150–400)
PMV BLD: 10.5 FL — SIGNIFICANT CHANGE UP (ref 7–13)
PMV BLD: 9.9 FL — SIGNIFICANT CHANGE UP (ref 7–13)
POTASSIUM SERPL-MCNC: 4 MMOL/L — SIGNIFICANT CHANGE UP (ref 3.5–5.3)
POTASSIUM SERPL-MCNC: 4.2 MMOL/L — SIGNIFICANT CHANGE UP (ref 3.5–5.3)
POTASSIUM SERPL-SCNC: 4 MMOL/L — SIGNIFICANT CHANGE UP (ref 3.5–5.3)
POTASSIUM SERPL-SCNC: 4.2 MMOL/L — SIGNIFICANT CHANGE UP (ref 3.5–5.3)
PROT SERPL-MCNC: 6 G/DL — SIGNIFICANT CHANGE UP (ref 6–8.3)
PROT SERPL-MCNC: 6.3 G/DL — SIGNIFICANT CHANGE UP (ref 6–8.3)
PROTHROM AB SERPL-ACNC: 12.7 SEC — SIGNIFICANT CHANGE UP (ref 9.9–13.4)
PROTHROM AB SERPL-ACNC: 13.4 SEC — SIGNIFICANT CHANGE UP (ref 9.9–13.4)
RBC # BLD: 4.03 M/UL — LOW (ref 4.2–5.8)
RBC # BLD: 4.3 M/UL — SIGNIFICANT CHANGE UP (ref 4.2–5.8)
RBC # FLD: 12.5 % — SIGNIFICANT CHANGE UP (ref 10.3–14.5)
RBC # FLD: 12.5 % — SIGNIFICANT CHANGE UP (ref 10.3–14.5)
RH IG SCN BLD-IMP: NEGATIVE — SIGNIFICANT CHANGE UP
SODIUM SERPL-SCNC: 138 MMOL/L — SIGNIFICANT CHANGE UP (ref 135–145)
SODIUM SERPL-SCNC: 139 MMOL/L — SIGNIFICANT CHANGE UP (ref 135–145)
WBC # BLD: 10.27 K/UL — SIGNIFICANT CHANGE UP (ref 3.8–10.5)
WBC # BLD: 12.51 K/UL — HIGH (ref 3.8–10.5)
WBC # FLD AUTO: 10.27 K/UL — SIGNIFICANT CHANGE UP (ref 3.8–10.5)
WBC # FLD AUTO: 12.51 K/UL — HIGH (ref 3.8–10.5)

## 2025-07-14 PROCEDURE — 85014 HEMATOCRIT: CPT

## 2025-07-14 PROCEDURE — 80053 COMPREHEN METABOLIC PANEL: CPT

## 2025-07-14 PROCEDURE — 33510 CABG VEIN SINGLE: CPT

## 2025-07-14 PROCEDURE — 85730 THROMBOPLASTIN TIME PARTIAL: CPT

## 2025-07-14 PROCEDURE — 36415 COLL VENOUS BLD VENIPUNCTURE: CPT

## 2025-07-14 PROCEDURE — 85610 PROTHROMBIN TIME: CPT

## 2025-07-14 PROCEDURE — 84132 ASSAY OF SERUM POTASSIUM: CPT

## 2025-07-14 PROCEDURE — 82947 ASSAY GLUCOSE BLOOD QUANT: CPT

## 2025-07-14 PROCEDURE — 84295 ASSAY OF SERUM SODIUM: CPT

## 2025-07-14 PROCEDURE — 86901 BLOOD TYPING SEROLOGIC RH(D): CPT

## 2025-07-14 PROCEDURE — 82330 ASSAY OF CALCIUM: CPT

## 2025-07-14 PROCEDURE — 76998 US GUIDE INTRAOP: CPT | Mod: 26,59

## 2025-07-14 PROCEDURE — 83605 ASSAY OF LACTIC ACID: CPT

## 2025-07-14 PROCEDURE — 83735 ASSAY OF MAGNESIUM: CPT

## 2025-07-14 PROCEDURE — 93010 ELECTROCARDIOGRAM REPORT: CPT

## 2025-07-14 PROCEDURE — 99291 CRITICAL CARE FIRST HOUR: CPT

## 2025-07-14 PROCEDURE — 82803 BLOOD GASES ANY COMBINATION: CPT

## 2025-07-14 PROCEDURE — 86900 BLOOD TYPING SEROLOGIC ABO: CPT

## 2025-07-14 PROCEDURE — 86850 RBC ANTIBODY SCREEN: CPT

## 2025-07-14 PROCEDURE — 76998 US GUIDE INTRAOP: CPT | Mod: 26,80,59

## 2025-07-14 PROCEDURE — 33510 CABG VEIN SINGLE: CPT | Mod: 80

## 2025-07-14 PROCEDURE — 84100 ASSAY OF PHOSPHORUS: CPT

## 2025-07-14 PROCEDURE — 85025 COMPLETE CBC W/AUTO DIFF WBC: CPT

## 2025-07-14 PROCEDURE — 82962 GLUCOSE BLOOD TEST: CPT

## 2025-07-14 PROCEDURE — 71045 X-RAY EXAM CHEST 1 VIEW: CPT | Mod: 26

## 2025-07-14 DEVICE — SHUNT VESSEL TAPR TIP 1.75MM 12MM SHAFT BX/10: Type: IMPLANTABLE DEVICE | Status: FUNCTIONAL

## 2025-07-14 DEVICE — SHUNT CORONARY VESSEL 1.5 MM TAPER TIP 12MM SHAFT: Type: IMPLANTABLE DEVICE | Status: FUNCTIONAL

## 2025-07-14 DEVICE — SURGICEL FIBRILLAR 4 X 4": Type: IMPLANTABLE DEVICE | Status: FUNCTIONAL

## 2025-07-14 DEVICE — SURGIFLO HEMOSTATIC MATRIX KIT: Type: IMPLANTABLE DEVICE | Status: FUNCTIONAL

## 2025-07-14 DEVICE — HEARTSTRING III PROXIMAL SEAL SYSTEM: Type: IMPLANTABLE DEVICE | Status: FUNCTIONAL

## 2025-07-14 RX ORDER — BRIMONIDINE TARTRATE 1.5 MG/ML
1 SOLUTION/ DROPS OPHTHALMIC
Refills: 0 | DISCHARGE

## 2025-07-14 RX ORDER — FUROSEMIDE 10 MG/ML
10 INJECTION INTRAMUSCULAR; INTRAVENOUS ONCE
Refills: 0 | Status: COMPLETED | OUTPATIENT
Start: 2025-07-14 | End: 2025-07-14

## 2025-07-14 RX ORDER — GABAPENTIN 400 MG/1
100 CAPSULE ORAL EVERY 8 HOURS
Refills: 0 | Status: DISCONTINUED | OUTPATIENT
Start: 2025-07-14 | End: 2025-07-18

## 2025-07-14 RX ORDER — ACETAMINOPHEN 500 MG/5ML
1000 LIQUID (ML) ORAL ONCE
Refills: 0 | Status: COMPLETED | OUTPATIENT
Start: 2025-07-14 | End: 2025-07-14

## 2025-07-14 RX ORDER — FENTANYL CITRATE-0.9 % NACL/PF 100MCG/2ML
12.5 SYRINGE (ML) INTRAVENOUS ONCE
Refills: 0 | Status: DISCONTINUED | OUTPATIENT
Start: 2025-07-14 | End: 2025-07-14

## 2025-07-14 RX ORDER — DEXTROSE 50 % IN WATER 50 %
50 SYRINGE (ML) INTRAVENOUS
Refills: 0 | Status: DISCONTINUED | OUTPATIENT
Start: 2025-07-14 | End: 2025-07-18

## 2025-07-14 RX ORDER — DEXMEDETOMIDINE HYDROCHLORIDE IN SODIUM CHLORIDE 4 UG/ML
0.1 INJECTION INTRAVENOUS
Qty: 400 | Refills: 0 | Status: DISCONTINUED | OUTPATIENT
Start: 2025-07-14 | End: 2025-07-15

## 2025-07-14 RX ORDER — ASPIRIN 325 MG
81 TABLET ORAL DAILY
Refills: 0 | Status: DISCONTINUED | OUTPATIENT
Start: 2025-07-14 | End: 2025-07-18

## 2025-07-14 RX ORDER — MUPIROCIN CALCIUM 20 MG/G
1 CREAM TOPICAL
Refills: 0 | Status: DISCONTINUED | OUTPATIENT
Start: 2025-07-14 | End: 2025-07-18

## 2025-07-14 RX ORDER — DEXTROSE 50 % IN WATER 50 %
25 SYRINGE (ML) INTRAVENOUS
Refills: 0 | Status: DISCONTINUED | OUTPATIENT
Start: 2025-07-14 | End: 2025-07-18

## 2025-07-14 RX ORDER — LATANOPROST PF 0.05 MG/ML
1 SOLUTION/ DROPS OPHTHALMIC AT BEDTIME
Refills: 0 | Status: DISCONTINUED | OUTPATIENT
Start: 2025-07-14 | End: 2025-07-18

## 2025-07-14 RX ORDER — CLOPIDOGREL BISULFATE 75 MG/1
75 TABLET, FILM COATED ORAL DAILY
Refills: 0 | Status: DISCONTINUED | OUTPATIENT
Start: 2025-07-14 | End: 2025-07-18

## 2025-07-14 RX ORDER — MIRABEGRON 50 MG/1
1 TABLET, FILM COATED, EXTENDED RELEASE ORAL
Refills: 0 | DISCHARGE

## 2025-07-14 RX ORDER — HEPARIN SODIUM 1000 [USP'U]/ML
5000 INJECTION INTRAVENOUS; SUBCUTANEOUS EVERY 8 HOURS
Refills: 0 | Status: DISCONTINUED | OUTPATIENT
Start: 2025-07-14 | End: 2025-07-15

## 2025-07-14 RX ORDER — ATORVASTATIN CALCIUM 80 MG/1
40 TABLET, FILM COATED ORAL AT BEDTIME
Refills: 0 | Status: DISCONTINUED | OUTPATIENT
Start: 2025-07-14 | End: 2025-07-18

## 2025-07-14 RX ORDER — BUPIVACAINE 13.3 MG/ML
20 INJECTION, SUSPENSION, LIPOSOMAL INFILTRATION ONCE
Refills: 0 | Status: DISCONTINUED | OUTPATIENT
Start: 2025-07-14 | End: 2025-07-15

## 2025-07-14 RX ORDER — MUPIROCIN CALCIUM 20 MG/G
1 CREAM TOPICAL ONCE
Refills: 0 | Status: COMPLETED | OUTPATIENT
Start: 2025-07-14 | End: 2025-07-14

## 2025-07-14 RX ORDER — CEFAZOLIN SODIUM IN 0.9 % NACL 3 G/100 ML
2000 INTRAVENOUS SOLUTION, PIGGYBACK (ML) INTRAVENOUS EVERY 8 HOURS
Refills: 0 | Status: COMPLETED | OUTPATIENT
Start: 2025-07-14 | End: 2025-07-16

## 2025-07-14 RX ORDER — POLYETHYLENE GLYCOL 3350 17 G/17G
17 POWDER, FOR SOLUTION ORAL DAILY
Refills: 0 | Status: DISCONTINUED | OUTPATIENT
Start: 2025-07-14 | End: 2025-07-18

## 2025-07-14 RX ORDER — BRIMONIDINE TARTRATE 1.5 MG/ML
1 SOLUTION/ DROPS OPHTHALMIC DAILY
Refills: 0 | Status: DISCONTINUED | OUTPATIENT
Start: 2025-07-14 | End: 2025-07-14

## 2025-07-14 RX ORDER — SENNA 187 MG
2 TABLET ORAL AT BEDTIME
Refills: 0 | Status: DISCONTINUED | OUTPATIENT
Start: 2025-07-15 | End: 2025-07-18

## 2025-07-14 RX ORDER — BUPIVACAINE 13.3 MG/ML
20 INJECTION, SUSPENSION, LIPOSOMAL INFILTRATION ONCE
Refills: 0 | Status: DISCONTINUED | OUTPATIENT
Start: 2025-07-14 | End: 2025-07-14

## 2025-07-14 RX ORDER — NICARDIPINE HCL 30 MG
5 CAPSULE ORAL
Qty: 40 | Refills: 0 | Status: DISCONTINUED | OUTPATIENT
Start: 2025-07-14 | End: 2025-07-15

## 2025-07-14 RX ORDER — MAGNESIUM SULFATE 500 MG/ML
2 SYRINGE (ML) INJECTION ONCE
Refills: 0 | Status: COMPLETED | OUTPATIENT
Start: 2025-07-14 | End: 2025-07-14

## 2025-07-14 RX ORDER — ACETAMINOPHEN 500 MG/5ML
1000 LIQUID (ML) ORAL EVERY 6 HOURS
Refills: 0 | Status: COMPLETED | OUTPATIENT
Start: 2025-07-14 | End: 2025-07-15

## 2025-07-14 RX ADMIN — Medication 12.5 MICROGRAM(S): at 20:47

## 2025-07-14 RX ADMIN — MUPIROCIN CALCIUM 1 APPLICATION(S): 20 CREAM TOPICAL at 11:47

## 2025-07-14 RX ADMIN — LATANOPROST PF 1 DROP(S): 0.05 SOLUTION/ DROPS OPHTHALMIC at 22:18

## 2025-07-14 RX ADMIN — Medication 1000 MILLIGRAM(S): at 11:47

## 2025-07-14 RX ADMIN — FUROSEMIDE 10 MILLIGRAM(S): 10 INJECTION INTRAMUSCULAR; INTRAVENOUS at 22:18

## 2025-07-14 RX ADMIN — IPRATROPIUM BROMIDE AND ALBUTEROL SULFATE 3 MILLILITER(S): .5; 2.5 SOLUTION RESPIRATORY (INHALATION) at 00:08

## 2025-07-14 RX ADMIN — Medication 12.5 MICROGRAM(S): at 21:05

## 2025-07-14 RX ADMIN — Medication 25 GRAM(S): at 21:10

## 2025-07-14 RX ADMIN — Medication 400 MILLIGRAM(S): at 23:32

## 2025-07-14 RX ADMIN — Medication 100 MILLIGRAM(S): at 21:11

## 2025-07-14 NOTE — BRIEF OPERATIVE NOTE - NSICDXBRIEFPOSTOP_GEN_ALL_CORE_FT
POST-OP DIAGNOSIS:  Anomalous origin of right coronary artery 14-Jul-2025 19:09:21  Citlali Oropeza

## 2025-07-14 NOTE — PRE-ANESTHESIA EVALUATION ADULT - NSANTHOSAYNRD_GEN_A_CORE
No. VIMAL screening performed.  STOP BANG Legend: 0-2 = LOW Risk; 3-4 = INTERMEDIATE Risk; 5-8 = HIGH Risk

## 2025-07-14 NOTE — PRE-ANESTHESIA EVALUATION ADULT - NSANTHPMHFT_GEN_ALL_CORE
Per primary team, this is a 74-year-old male with a PMH of CAD s/p PCI to RCA 3 years ago, RA, HTN, HLD, pre-DM, BPH, VIMAL, and overactive bladder, who presents for evaluation and management of anomalous RCA and aortic stenosis. Patient reports mild CP at rest (2/10). However, with exertion the CP increases 6/10. He also reports some MARIEE when walking one block that has been progressively worsening since 2019. Patient is not a candidate for transposition of the RCA and therefore presents for OPCAB. ?    Upon meeting patient, no CP at rest/SOB/N/V/GERD. No numbness or weakness. METS < 4. All chronic conditions stable. Per primary team, this is a 74-year-old male with a PMH of CAD s/p PCI to RCA 3 years ago, RA, HTN, HLD, pre-DM, BPH, VIMAL, and overactive bladder, who presents for evaluation and management of anomalous RCA and aortic stenosis. Patient reports mild CP at rest (2/10). However, with exertion the CP increases 6/10. He also reports some MARIEE when walking one block that has been progressively worsening since 2019. Patient is not a candidate for transposition of the RCA and therefore presents for OPCAB.    Upon meeting patient, no CP at rest/SOB/N/V/GERD. No numbness or weakness but endorses bilateral foot neuropathy. METS < 4. All chronic conditions stable.

## 2025-07-14 NOTE — PRE-ANESTHESIA EVALUATION ADULT - NSATTENDATTESTRD_GEN_ALL_CORE
Pediatric Physical Therapy Outpatient Progress Note/Re-Evaluation    Name: Mark Cortes  Date: 9/13/2017  Clinic #: [id number]  Time in: 850 am  Time out: 930 am    Visit 11 of 25 approved visits, expiring 12/31/2017    Subjective:  Mark was brought to therapy by his mother  Mom reports no new complaints.    Pain: Mark is unable to rate pain on numeric scale. Crying throughout session but not in regards to any particular movement or activity.    Objective:  Parent/Caregiver weighted in observation room with summary at end of session.  Mark was seen for 40 minutes of physical therapy services; including: therapeutic exercise, neuromuscular re-ed, pre-gait training, sensory integration, therapeutic activities.    Education:  Patient's parent was educated on patient's current functional status and progress.  Patient's parent was educated on updated HEP.  Patient's parent verbalized understanding.    Treatment:  Session focused on: exercises to develop LE strength and muscular endurance, LE range of motion and flexibility, sitting balance, standing balance, coordination, posture, kinesthetic sense and proprioception, desensitization techniques, facilitation of gait, enhancement of sensory processing, promotion of adaptive responses to environmental demands, gross motor stimulation, cardiovascular endurance training, parent education and training, initiation/progression of HEP eye-hand coordination, core muscle activation.    Activities included:    -squat <> stand with min-mod A, multiple trials   -floor to stand transition through half kneel, alternating legs, Daniel right leg and modA left leg   -sit <> stand transition from low bench with Daniel x10 trials    -walking between 2 surfaces 3 feet apart x3 trials with SBA-CGA at pelvis   -walking 40 ft x2 trials with 1 finger assist       Treatment was tolerated: fair    Assessment:  Mark was seen for follow up session. He had decreased tolerance to  The patient has been re-examined and I agree with the above assessment or I updated with my findings. therapeutic activities today, crying throughout. He was able to stand from a low bench with trunk flexion and hip and knee extension with min A, demonstrating increased strength in BLEs, but decreased tolerance for squat to stand activities. He continues to fall from standing without attempting to catch himself. Mark would benefit from Physical Therapy to progress towards the following goals to address the above impairments and functional limitations.    Goals  Short term 3 months: 4/12/2017 continue to 5/30/3017  1. Mark will demonstrate independent rolling supine to prone   2. Mark will demonstrate independent prone to quad for play (goal met 8/30/17)  3. Mark will demonstrate independent crawling x ~ 10 feet independently (goal surpassed 8/30/17)     Long term 6 months: 12/31/2017  1. Parents will be independent with HEP   2. Mark will demonstrate independent creeping x > 10 feet (goal met 8/30/17)  3. Mark will demonstrate independent pull to stand (goal met 8/30/17)  4. Mark will demonstrate initiation of cruising x 4-5 steps bilaterally with CGA (goal met 8/30/17)  5. NEW GOAL ADDED 8/30/17: Mark will independently lower himself from stand to sit without falling 3/5 trials.  6. NEW GOAL ADDED 8/30/17: Mark will stand from the floor with CGA 3/5 trials.  7. NEW GOAL ADDED 8/30/17: Mark will stand unsupported for 5-10 seconds without losing his balance or falling 3/5 trials.       Plan:  Continue PT treatments 1x a week with current POC to progress toward goals.    Moises Ramirez, PT  9/13/2017

## 2025-07-14 NOTE — BRIEF OPERATIVE NOTE - NSICDXBRIEFPREOP_GEN_ALL_CORE_FT
PRE-OP DIAGNOSIS:  Anomalous origin of right coronary artery 14-Jul-2025 19:09:15  Citlali Oropeza

## 2025-07-14 NOTE — PRE-ANESTHESIA EVALUATION ADULT - NSDENTALSD_ENT_ALL_CORE
appears normal and intact Missing lowers aside from front teeth on top and bottom. All others intact./appears normal and intact/missing teeth

## 2025-07-14 NOTE — BRIEF OPERATIVE NOTE - NSICDXBRIEFPROCEDURE_GEN_ALL_CORE_FT
PROCEDURES:  CABG, with YOSELIN 14-Jul-2025 19:07:20 CABG x1 SVG-OM EF nml Citlali Oropeza   PROCEDURES:  CABG, with YOSELIN 14-Jul-2025 19:07:20 CABG x1 SVG-pda EF nml Citlali Oropeza

## 2025-07-14 NOTE — PRE-ANESTHESIA EVALUATION ADULT - NSRADCARDRESULTSFT_GEN_ALL_CORE
CXR, carotid duplex, EKG reviewed    CTA 2/10/2025  Cardiac:  1. 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  2. Unable to assess RCA for stenosis due to motion artifact.  3. Calcific plaque obscures the lumen left circumflex, OM1, OM2, D1, proximal LAD, mid and distal LAD, RCA precluding stenosis evaluation.  4. Remaining segments demonstrate non obstructive coronary artery disease.    Cardiac cath 2/17/2025  1. Right dominant circulation  2. 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  3. Cathworks FFR with 3D functional mapping of color-coded FFR of the LAD is negative 0.97  4. Cathworks FFR with 3D functional mapping of color-coded FFR of the LCX is negative 0.91  4. Hemostasis for right femoral/perclose    TTE 1/15/2025  normal EF. heavy aortic valve calcifications. Per my assessment, severity of aortic stenosis appears moderate. ascending aorta 4cm.    TTE 7/8/2025:   1. Normal left ventricular diastolic function, with normal left ventricular filling pressure.   2. Normal right ventricular cavity size, with normal wall thickness, and normal right ventricular systolic function.   3. Left atrium is moderately dilated.   4. Fibrocalcific aortic valve sclerosis without significant stenosis.   5. No echocardiographic evidence of pulmonary hypertension.   6. Estimated pulmonary artery systolic pressure is 25 mmHg.   7. No pericardial effusion seen.   8. Aortic root at the sinuses of Valsalva is normal in size, measuring 3.50 cm (indexed 1.73 cm/m²).   9. No prior echocardiogram is available for comparison.

## 2025-07-15 LAB
ALBUMIN SERPL ELPH-MCNC: 3.9 G/DL — SIGNIFICANT CHANGE UP (ref 3.3–5)
ALBUMIN SERPL ELPH-MCNC: 4.5 G/DL — SIGNIFICANT CHANGE UP (ref 3.3–5)
ALBUMIN SERPL ELPH-MCNC: 4.5 G/DL — SIGNIFICANT CHANGE UP (ref 3.3–5)
ALP SERPL-CCNC: 49 U/L — SIGNIFICANT CHANGE UP (ref 40–120)
ALP SERPL-CCNC: 51 U/L — SIGNIFICANT CHANGE UP (ref 40–120)
ALP SERPL-CCNC: 63 U/L — SIGNIFICANT CHANGE UP (ref 40–120)
ALT FLD-CCNC: 11 U/L — SIGNIFICANT CHANGE UP (ref 10–45)
ALT FLD-CCNC: 12 U/L — SIGNIFICANT CHANGE UP (ref 10–45)
ALT FLD-CCNC: 12 U/L — SIGNIFICANT CHANGE UP (ref 10–45)
ANION GAP SERPL CALC-SCNC: 10 MMOL/L — SIGNIFICANT CHANGE UP (ref 5–17)
ANION GAP SERPL CALC-SCNC: 13 MMOL/L — SIGNIFICANT CHANGE UP (ref 5–17)
ANION GAP SERPL CALC-SCNC: 13 MMOL/L — SIGNIFICANT CHANGE UP (ref 5–17)
ANION GAP SERPL CALC-SCNC: 9 MMOL/L — SIGNIFICANT CHANGE UP (ref 5–17)
APTT BLD: 26.9 SEC — SIGNIFICANT CHANGE UP (ref 26.1–36.8)
APTT BLD: 29.5 SEC — SIGNIFICANT CHANGE UP (ref 26.1–36.8)
APTT BLD: 29.9 SEC — SIGNIFICANT CHANGE UP (ref 26.1–36.8)
AST SERPL-CCNC: 23 U/L — SIGNIFICANT CHANGE UP (ref 10–40)
AST SERPL-CCNC: 26 U/L — SIGNIFICANT CHANGE UP (ref 10–40)
AST SERPL-CCNC: 29 U/L — SIGNIFICANT CHANGE UP (ref 10–40)
BASOPHILS # BLD AUTO: 0.01 K/UL — SIGNIFICANT CHANGE UP (ref 0–0.2)
BASOPHILS # BLD AUTO: 0.01 K/UL — SIGNIFICANT CHANGE UP (ref 0–0.2)
BASOPHILS # BLD AUTO: 0.02 K/UL — SIGNIFICANT CHANGE UP (ref 0–0.2)
BASOPHILS NFR BLD AUTO: 0.1 % — SIGNIFICANT CHANGE UP (ref 0–2)
BASOPHILS NFR BLD AUTO: 0.1 % — SIGNIFICANT CHANGE UP (ref 0–2)
BASOPHILS NFR BLD AUTO: 0.2 % — SIGNIFICANT CHANGE UP (ref 0–2)
BILIRUB SERPL-MCNC: 0.4 MG/DL — SIGNIFICANT CHANGE UP (ref 0.2–1.2)
BILIRUB SERPL-MCNC: 0.5 MG/DL — SIGNIFICANT CHANGE UP (ref 0.2–1.2)
BILIRUB SERPL-MCNC: 0.6 MG/DL — SIGNIFICANT CHANGE UP (ref 0.2–1.2)
BUN SERPL-MCNC: 20 MG/DL — SIGNIFICANT CHANGE UP (ref 7–23)
BUN SERPL-MCNC: 26 MG/DL — HIGH (ref 7–23)
BUN SERPL-MCNC: 27 MG/DL — HIGH (ref 7–23)
BUN SERPL-MCNC: 29 MG/DL — HIGH (ref 7–23)
CALCIUM SERPL-MCNC: 8.3 MG/DL — LOW (ref 8.4–10.5)
CALCIUM SERPL-MCNC: 8.9 MG/DL — SIGNIFICANT CHANGE UP (ref 8.4–10.5)
CALCIUM SERPL-MCNC: 9 MG/DL — SIGNIFICANT CHANGE UP (ref 8.4–10.5)
CALCIUM SERPL-MCNC: 9.3 MG/DL — SIGNIFICANT CHANGE UP (ref 8.4–10.5)
CHLORIDE SERPL-SCNC: 100 MMOL/L — SIGNIFICANT CHANGE UP (ref 96–108)
CHLORIDE SERPL-SCNC: 101 MMOL/L — SIGNIFICANT CHANGE UP (ref 96–108)
CHLORIDE SERPL-SCNC: 102 MMOL/L — SIGNIFICANT CHANGE UP (ref 96–108)
CHLORIDE SERPL-SCNC: 99 MMOL/L — SIGNIFICANT CHANGE UP (ref 96–108)
CO2 SERPL-SCNC: 22 MMOL/L — SIGNIFICANT CHANGE UP (ref 22–31)
CO2 SERPL-SCNC: 24 MMOL/L — SIGNIFICANT CHANGE UP (ref 22–31)
CO2 SERPL-SCNC: 24 MMOL/L — SIGNIFICANT CHANGE UP (ref 22–31)
CO2 SERPL-SCNC: 27 MMOL/L — SIGNIFICANT CHANGE UP (ref 22–31)
CREAT SERPL-MCNC: 1.09 MG/DL — SIGNIFICANT CHANGE UP (ref 0.5–1.3)
CREAT SERPL-MCNC: 1.36 MG/DL — HIGH (ref 0.5–1.3)
CREAT SERPL-MCNC: 1.44 MG/DL — HIGH (ref 0.5–1.3)
CREAT SERPL-MCNC: 1.48 MG/DL — HIGH (ref 0.5–1.3)
EGFR: 49 ML/MIN/1.73M2 — LOW
EGFR: 49 ML/MIN/1.73M2 — LOW
EGFR: 51 ML/MIN/1.73M2 — LOW
EGFR: 51 ML/MIN/1.73M2 — LOW
EGFR: 55 ML/MIN/1.73M2 — LOW
EGFR: 55 ML/MIN/1.73M2 — LOW
EGFR: 71 ML/MIN/1.73M2 — SIGNIFICANT CHANGE UP
EGFR: 71 ML/MIN/1.73M2 — SIGNIFICANT CHANGE UP
EOSINOPHIL # BLD AUTO: 0 K/UL — SIGNIFICANT CHANGE UP (ref 0–0.5)
EOSINOPHIL NFR BLD AUTO: 0 % — SIGNIFICANT CHANGE UP (ref 0–6)
GAS PNL BLDA: SIGNIFICANT CHANGE UP
GLUCOSE SERPL-MCNC: 124 MG/DL — HIGH (ref 70–99)
GLUCOSE SERPL-MCNC: 127 MG/DL — HIGH (ref 70–99)
GLUCOSE SERPL-MCNC: 142 MG/DL — HIGH (ref 70–99)
GLUCOSE SERPL-MCNC: 143 MG/DL — HIGH (ref 70–99)
HCT VFR BLD CALC: 29 % — LOW (ref 39–50)
HCT VFR BLD CALC: 30.5 % — LOW (ref 39–50)
HCT VFR BLD CALC: 34.7 % — LOW (ref 39–50)
HGB BLD-MCNC: 10.1 G/DL — LOW (ref 13–17)
HGB BLD-MCNC: 11.6 G/DL — LOW (ref 13–17)
HGB BLD-MCNC: 9.6 G/DL — LOW (ref 13–17)
IMM GRANULOCYTES # BLD AUTO: 0.05 K/UL — SIGNIFICANT CHANGE UP (ref 0–0.07)
IMM GRANULOCYTES # BLD AUTO: 0.06 K/UL — SIGNIFICANT CHANGE UP (ref 0–0.07)
IMM GRANULOCYTES # BLD AUTO: 0.06 K/UL — SIGNIFICANT CHANGE UP (ref 0–0.07)
IMM GRANULOCYTES NFR BLD AUTO: 0.4 % — SIGNIFICANT CHANGE UP (ref 0–0.9)
IMM GRANULOCYTES NFR BLD AUTO: 0.5 % — SIGNIFICANT CHANGE UP (ref 0–0.9)
IMM GRANULOCYTES NFR BLD AUTO: 0.5 % — SIGNIFICANT CHANGE UP (ref 0–0.9)
INR BLD: 1.12 — SIGNIFICANT CHANGE UP (ref 0.85–1.16)
INR BLD: 1.19 — HIGH (ref 0.85–1.16)
INR BLD: 1.22 — HIGH (ref 0.85–1.16)
LYMPHOCYTES # BLD AUTO: 0.62 K/UL — LOW (ref 1–3.3)
LYMPHOCYTES # BLD AUTO: 1.02 K/UL — SIGNIFICANT CHANGE UP (ref 1–3.3)
LYMPHOCYTES # BLD AUTO: 1.25 K/UL — SIGNIFICANT CHANGE UP (ref 1–3.3)
LYMPHOCYTES NFR BLD AUTO: 5 % — LOW (ref 13–44)
LYMPHOCYTES NFR BLD AUTO: 8.3 % — LOW (ref 13–44)
LYMPHOCYTES NFR BLD AUTO: 9.8 % — LOW (ref 13–44)
MAGNESIUM SERPL-MCNC: 2.2 MG/DL — SIGNIFICANT CHANGE UP (ref 1.6–2.6)
MAGNESIUM SERPL-MCNC: 2.3 MG/DL — SIGNIFICANT CHANGE UP (ref 1.6–2.6)
MAGNESIUM SERPL-MCNC: 2.4 MG/DL — SIGNIFICANT CHANGE UP (ref 1.6–2.6)
MCHC RBC-ENTMCNC: 29 PG — SIGNIFICANT CHANGE UP (ref 27–34)
MCHC RBC-ENTMCNC: 29.1 PG — SIGNIFICANT CHANGE UP (ref 27–34)
MCHC RBC-ENTMCNC: 29.3 PG — SIGNIFICANT CHANGE UP (ref 27–34)
MCHC RBC-ENTMCNC: 33.1 G/DL — SIGNIFICANT CHANGE UP (ref 32–36)
MCHC RBC-ENTMCNC: 33.1 G/DL — SIGNIFICANT CHANGE UP (ref 32–36)
MCHC RBC-ENTMCNC: 33.4 G/DL — SIGNIFICANT CHANGE UP (ref 32–36)
MCV RBC AUTO: 87.2 FL — SIGNIFICANT CHANGE UP (ref 80–100)
MCV RBC AUTO: 87.6 FL — SIGNIFICANT CHANGE UP (ref 80–100)
MCV RBC AUTO: 88.4 FL — SIGNIFICANT CHANGE UP (ref 80–100)
MONOCYTES # BLD AUTO: 0.93 K/UL — HIGH (ref 0–0.9)
MONOCYTES # BLD AUTO: 1.5 K/UL — HIGH (ref 0–0.9)
MONOCYTES # BLD AUTO: 1.56 K/UL — HIGH (ref 0–0.9)
MONOCYTES NFR BLD AUTO: 11.7 % — SIGNIFICANT CHANGE UP (ref 2–14)
MONOCYTES NFR BLD AUTO: 12.7 % — SIGNIFICANT CHANGE UP (ref 2–14)
MONOCYTES NFR BLD AUTO: 7.6 % — SIGNIFICANT CHANGE UP (ref 2–14)
NEUTROPHILS # BLD AUTO: 10 K/UL — HIGH (ref 1.8–7.4)
NEUTROPHILS # BLD AUTO: 10.67 K/UL — HIGH (ref 1.8–7.4)
NEUTROPHILS # BLD AUTO: 9.68 K/UL — HIGH (ref 1.8–7.4)
NEUTROPHILS NFR BLD AUTO: 77.9 % — HIGH (ref 43–77)
NEUTROPHILS NFR BLD AUTO: 78.4 % — HIGH (ref 43–77)
NEUTROPHILS NFR BLD AUTO: 86.8 % — HIGH (ref 43–77)
NRBC # BLD AUTO: 0 K/UL — SIGNIFICANT CHANGE UP (ref 0–0)
NRBC # FLD: 0 K/UL — SIGNIFICANT CHANGE UP (ref 0–0)
NRBC BLD AUTO-RTO: 0 /100 WBCS — SIGNIFICANT CHANGE UP (ref 0–0)
PHOSPHATE SERPL-MCNC: 3.5 MG/DL — SIGNIFICANT CHANGE UP (ref 2.5–4.5)
PHOSPHATE SERPL-MCNC: 3.5 MG/DL — SIGNIFICANT CHANGE UP (ref 2.5–4.5)
PHOSPHATE SERPL-MCNC: 4.7 MG/DL — HIGH (ref 2.5–4.5)
PLATELET # BLD AUTO: 178 K/UL — SIGNIFICANT CHANGE UP (ref 150–400)
PLATELET # BLD AUTO: 193 K/UL — SIGNIFICANT CHANGE UP (ref 150–400)
PLATELET # BLD AUTO: 193 K/UL — SIGNIFICANT CHANGE UP (ref 150–400)
PMV BLD: 10.3 FL — SIGNIFICANT CHANGE UP (ref 7–13)
PMV BLD: 10.5 FL — SIGNIFICANT CHANGE UP (ref 7–13)
PMV BLD: 10.5 FL — SIGNIFICANT CHANGE UP (ref 7–13)
POTASSIUM SERPL-MCNC: 4 MMOL/L — SIGNIFICANT CHANGE UP (ref 3.5–5.3)
POTASSIUM SERPL-MCNC: 4.1 MMOL/L — SIGNIFICANT CHANGE UP (ref 3.5–5.3)
POTASSIUM SERPL-MCNC: 4.2 MMOL/L — SIGNIFICANT CHANGE UP (ref 3.5–5.3)
POTASSIUM SERPL-MCNC: 4.2 MMOL/L — SIGNIFICANT CHANGE UP (ref 3.5–5.3)
POTASSIUM SERPL-SCNC: 4 MMOL/L — SIGNIFICANT CHANGE UP (ref 3.5–5.3)
POTASSIUM SERPL-SCNC: 4.1 MMOL/L — SIGNIFICANT CHANGE UP (ref 3.5–5.3)
POTASSIUM SERPL-SCNC: 4.2 MMOL/L — SIGNIFICANT CHANGE UP (ref 3.5–5.3)
POTASSIUM SERPL-SCNC: 4.2 MMOL/L — SIGNIFICANT CHANGE UP (ref 3.5–5.3)
PROT SERPL-MCNC: 6 G/DL — SIGNIFICANT CHANGE UP (ref 6–8.3)
PROT SERPL-MCNC: 6.3 G/DL — SIGNIFICANT CHANGE UP (ref 6–8.3)
PROT SERPL-MCNC: 6.4 G/DL — SIGNIFICANT CHANGE UP (ref 6–8.3)
PROTHROM AB SERPL-ACNC: 13.1 SEC — SIGNIFICANT CHANGE UP (ref 9.9–13.4)
PROTHROM AB SERPL-ACNC: 13.9 SEC — HIGH (ref 9.9–13.4)
PROTHROM AB SERPL-ACNC: 14 SEC — HIGH (ref 9.9–13.4)
RBC # BLD: 3.28 M/UL — LOW (ref 4.2–5.8)
RBC # BLD: 3.48 M/UL — LOW (ref 4.2–5.8)
RBC # BLD: 3.98 M/UL — LOW (ref 4.2–5.8)
RBC # FLD: 12.4 % — SIGNIFICANT CHANGE UP (ref 10.3–14.5)
RBC # FLD: 12.9 % — SIGNIFICANT CHANGE UP (ref 10.3–14.5)
RBC # FLD: 13 % — SIGNIFICANT CHANGE UP (ref 10.3–14.5)
SODIUM SERPL-SCNC: 134 MMOL/L — LOW (ref 135–145)
SODIUM SERPL-SCNC: 136 MMOL/L — SIGNIFICANT CHANGE UP (ref 135–145)
SODIUM SERPL-SCNC: 136 MMOL/L — SIGNIFICANT CHANGE UP (ref 135–145)
SODIUM SERPL-SCNC: 138 MMOL/L — SIGNIFICANT CHANGE UP (ref 135–145)
WBC # BLD: 12.29 K/UL — HIGH (ref 3.8–10.5)
WBC # BLD: 12.33 K/UL — HIGH (ref 3.8–10.5)
WBC # BLD: 12.82 K/UL — HIGH (ref 3.8–10.5)
WBC # FLD AUTO: 12.29 K/UL — HIGH (ref 3.8–10.5)
WBC # FLD AUTO: 12.33 K/UL — HIGH (ref 3.8–10.5)
WBC # FLD AUTO: 12.82 K/UL — HIGH (ref 3.8–10.5)

## 2025-07-15 PROCEDURE — 94640 AIRWAY INHALATION TREATMENT: CPT

## 2025-07-15 PROCEDURE — 82947 ASSAY GLUCOSE BLOOD QUANT: CPT

## 2025-07-15 PROCEDURE — 85730 THROMBOPLASTIN TIME PARTIAL: CPT

## 2025-07-15 PROCEDURE — 83735 ASSAY OF MAGNESIUM: CPT

## 2025-07-15 PROCEDURE — 85025 COMPLETE CBC W/AUTO DIFF WBC: CPT

## 2025-07-15 PROCEDURE — 86850 RBC ANTIBODY SCREEN: CPT

## 2025-07-15 PROCEDURE — 83605 ASSAY OF LACTIC ACID: CPT

## 2025-07-15 PROCEDURE — 82803 BLOOD GASES ANY COMBINATION: CPT

## 2025-07-15 PROCEDURE — 36415 COLL VENOUS BLD VENIPUNCTURE: CPT

## 2025-07-15 PROCEDURE — 99292 CRITICAL CARE ADDL 30 MIN: CPT

## 2025-07-15 PROCEDURE — 86900 BLOOD TYPING SEROLOGIC ABO: CPT

## 2025-07-15 PROCEDURE — 99291 CRITICAL CARE FIRST HOUR: CPT

## 2025-07-15 PROCEDURE — 85610 PROTHROMBIN TIME: CPT

## 2025-07-15 PROCEDURE — 84295 ASSAY OF SERUM SODIUM: CPT

## 2025-07-15 PROCEDURE — 84100 ASSAY OF PHOSPHORUS: CPT

## 2025-07-15 PROCEDURE — 80048 BASIC METABOLIC PNL TOTAL CA: CPT

## 2025-07-15 PROCEDURE — 82962 GLUCOSE BLOOD TEST: CPT

## 2025-07-15 PROCEDURE — 86923 COMPATIBILITY TEST ELECTRIC: CPT

## 2025-07-15 PROCEDURE — 71045 X-RAY EXAM CHEST 1 VIEW: CPT

## 2025-07-15 PROCEDURE — 94002 VENT MGMT INPAT INIT DAY: CPT

## 2025-07-15 PROCEDURE — 86901 BLOOD TYPING SEROLOGIC RH(D): CPT

## 2025-07-15 PROCEDURE — 80053 COMPREHEN METABOLIC PANEL: CPT

## 2025-07-15 PROCEDURE — 71045 X-RAY EXAM CHEST 1 VIEW: CPT | Mod: 26

## 2025-07-15 PROCEDURE — 82330 ASSAY OF CALCIUM: CPT

## 2025-07-15 PROCEDURE — P9045: CPT

## 2025-07-15 PROCEDURE — 84132 ASSAY OF SERUM POTASSIUM: CPT

## 2025-07-15 PROCEDURE — 85014 HEMATOCRIT: CPT

## 2025-07-15 RX ORDER — OXYCODONE HYDROCHLORIDE 30 MG/1
5 TABLET ORAL EVERY 6 HOURS
Refills: 0 | Status: DISCONTINUED | OUTPATIENT
Start: 2025-07-15 | End: 2025-07-18

## 2025-07-15 RX ORDER — GLUCAGON 3 MG/1
1 POWDER NASAL ONCE
Refills: 0 | Status: DISCONTINUED | OUTPATIENT
Start: 2025-07-15 | End: 2025-07-18

## 2025-07-15 RX ORDER — CALCIUM GLUCONATE 20 MG/ML
2 INJECTION, SOLUTION INTRAVENOUS ONCE
Refills: 0 | Status: COMPLETED | OUTPATIENT
Start: 2025-07-15 | End: 2025-07-15

## 2025-07-15 RX ORDER — INSULIN LISPRO 100 U/ML
INJECTION, SOLUTION INTRAVENOUS; SUBCUTANEOUS
Refills: 0 | Status: DISCONTINUED | OUTPATIENT
Start: 2025-07-15 | End: 2025-07-18

## 2025-07-15 RX ORDER — PREGABALIN 50 MG/1
50 CAPSULE ORAL DAILY
Refills: 0 | Status: DISCONTINUED | OUTPATIENT
Start: 2025-07-15 | End: 2025-07-18

## 2025-07-15 RX ORDER — SODIUM CHLORIDE 9 G/1000ML
1000 INJECTION, SOLUTION INTRAVENOUS
Refills: 0 | Status: DISCONTINUED | OUTPATIENT
Start: 2025-07-15 | End: 2025-07-18

## 2025-07-15 RX ORDER — DEXTROSE 50 % IN WATER 50 %
15 SYRINGE (ML) INTRAVENOUS ONCE
Refills: 0 | Status: DISCONTINUED | OUTPATIENT
Start: 2025-07-15 | End: 2025-07-18

## 2025-07-15 RX ORDER — ALBUMIN (HUMAN) 12.5 G/50ML
250 INJECTION, SOLUTION INTRAVENOUS
Refills: 0 | Status: COMPLETED | OUTPATIENT
Start: 2025-07-15 | End: 2025-07-15

## 2025-07-15 RX ORDER — ACETAMINOPHEN 500 MG/5ML
1000 LIQUID (ML) ORAL EVERY 6 HOURS
Refills: 0 | Status: DISCONTINUED | OUTPATIENT
Start: 2025-07-16 | End: 2025-07-18

## 2025-07-15 RX ORDER — IPRATROPIUM BROMIDE AND ALBUTEROL SULFATE .5; 2.5 MG/3ML; MG/3ML
3 SOLUTION RESPIRATORY (INHALATION) ONCE
Refills: 0 | Status: COMPLETED | OUTPATIENT
Start: 2025-07-15 | End: 2025-07-14

## 2025-07-15 RX ORDER — HEPARIN SODIUM 1000 [USP'U]/ML
7500 INJECTION INTRAVENOUS; SUBCUTANEOUS EVERY 8 HOURS
Refills: 0 | Status: DISCONTINUED | OUTPATIENT
Start: 2025-07-15 | End: 2025-07-18

## 2025-07-15 RX ORDER — MAGNESIUM, ALUMINUM HYDROXIDE 200-200 MG
30 TABLET,CHEWABLE ORAL EVERY 4 HOURS
Refills: 0 | Status: DISCONTINUED | OUTPATIENT
Start: 2025-07-15 | End: 2025-07-18

## 2025-07-15 RX ORDER — ALBUMIN (HUMAN) 12.5 G/50ML
250 INJECTION, SOLUTION INTRAVENOUS ONCE
Refills: 0 | Status: COMPLETED | OUTPATIENT
Start: 2025-07-15 | End: 2025-07-15

## 2025-07-15 RX ADMIN — Medication 30 MILLILITER(S): at 16:43

## 2025-07-15 RX ADMIN — Medication 400 MILLIGRAM(S): at 11:33

## 2025-07-15 RX ADMIN — MUPIROCIN CALCIUM 1 APPLICATION(S): 20 CREAM TOPICAL at 17:15

## 2025-07-15 RX ADMIN — Medication 100 MILLIGRAM(S): at 05:19

## 2025-07-15 RX ADMIN — ALBUMIN (HUMAN) 125 MILLILITER(S): 12.5 INJECTION, SOLUTION INTRAVENOUS at 09:37

## 2025-07-15 RX ADMIN — GABAPENTIN 100 MILLIGRAM(S): 400 CAPSULE ORAL at 21:39

## 2025-07-15 RX ADMIN — HEPARIN SODIUM 5000 UNIT(S): 1000 INJECTION INTRAVENOUS; SUBCUTANEOUS at 05:19

## 2025-07-15 RX ADMIN — CLOPIDOGREL BISULFATE 75 MILLIGRAM(S): 75 TABLET, FILM COATED ORAL at 11:34

## 2025-07-15 RX ADMIN — Medication 100 MILLIGRAM(S): at 13:19

## 2025-07-15 RX ADMIN — PREGABALIN 50 MILLIGRAM(S): 50 CAPSULE ORAL at 12:53

## 2025-07-15 RX ADMIN — Medication 1000 MILLIGRAM(S): at 12:00

## 2025-07-15 RX ADMIN — HEPARIN SODIUM 7500 UNIT(S): 1000 INJECTION INTRAVENOUS; SUBCUTANEOUS at 21:40

## 2025-07-15 RX ADMIN — Medication 1000 MILLIGRAM(S): at 22:24

## 2025-07-15 RX ADMIN — GABAPENTIN 100 MILLIGRAM(S): 400 CAPSULE ORAL at 05:18

## 2025-07-15 RX ADMIN — MUPIROCIN CALCIUM 1 APPLICATION(S): 20 CREAM TOPICAL at 05:18

## 2025-07-15 RX ADMIN — CALCIUM GLUCONATE 200 GRAM(S): 20 INJECTION, SOLUTION INTRAVENOUS at 12:53

## 2025-07-15 RX ADMIN — Medication 500 MILLIGRAM(S): at 17:15

## 2025-07-15 RX ADMIN — HEPARIN SODIUM 7500 UNIT(S): 1000 INJECTION INTRAVENOUS; SUBCUTANEOUS at 13:19

## 2025-07-15 RX ADMIN — ALBUMIN (HUMAN) 125 MILLILITER(S): 12.5 INJECTION, SOLUTION INTRAVENOUS at 08:40

## 2025-07-15 RX ADMIN — ALBUMIN (HUMAN) 125 MILLILITER(S): 12.5 INJECTION, SOLUTION INTRAVENOUS at 15:11

## 2025-07-15 RX ADMIN — Medication 400 MILLIGRAM(S): at 21:39

## 2025-07-15 RX ADMIN — ALBUMIN (HUMAN) 125 MILLILITER(S): 12.5 INJECTION, SOLUTION INTRAVENOUS at 11:34

## 2025-07-15 RX ADMIN — Medication 100 MILLIGRAM(S): at 21:39

## 2025-07-15 RX ADMIN — Medication 1000 MILLIGRAM(S): at 06:18

## 2025-07-15 RX ADMIN — Medication 1 APPLICATION(S): at 12:54

## 2025-07-15 RX ADMIN — Medication 3 MILLILITER(S): at 13:13

## 2025-07-15 RX ADMIN — LATANOPROST PF 1 DROP(S): 0.05 SOLUTION/ DROPS OPHTHALMIC at 22:24

## 2025-07-15 RX ADMIN — Medication 3 MILLILITER(S): at 21:15

## 2025-07-15 RX ADMIN — ALBUMIN (HUMAN) 125 MILLILITER(S): 12.5 INJECTION, SOLUTION INTRAVENOUS at 08:42

## 2025-07-15 RX ADMIN — ATORVASTATIN CALCIUM 40 MILLIGRAM(S): 80 TABLET, FILM COATED ORAL at 21:40

## 2025-07-15 RX ADMIN — Medication 400 MILLIGRAM(S): at 05:18

## 2025-07-15 RX ADMIN — SODIUM CHLORIDE 100 MILLILITER(S): 9 INJECTION, SOLUTION INTRAVENOUS at 17:19

## 2025-07-15 RX ADMIN — Medication 1000 MILLIGRAM(S): at 00:00

## 2025-07-15 RX ADMIN — GABAPENTIN 100 MILLIGRAM(S): 400 CAPSULE ORAL at 13:19

## 2025-07-15 RX ADMIN — Medication 81 MILLIGRAM(S): at 11:33

## 2025-07-15 RX ADMIN — POLYETHYLENE GLYCOL 3350 17 GRAM(S): 17 POWDER, FOR SOLUTION ORAL at 11:33

## 2025-07-15 RX ADMIN — Medication 2 TABLET(S): at 22:24

## 2025-07-15 RX ADMIN — Medication 500 MILLIGRAM(S): at 05:19

## 2025-07-15 RX ADMIN — Medication 40 MILLIGRAM(S): at 06:26

## 2025-07-15 NOTE — PHYSICAL THERAPY INITIAL EVALUATION ADULT - GENERAL OBSERVATIONS, REHAB EVAL
Received sitting in chair complaints of abdominal pain 8/10 +TLC, 3JP, mtz, EKG, araceli. Left as found +lines intact, call STEVE coleman aware
yes

## 2025-07-15 NOTE — DIETITIAN INITIAL EVALUATION ADULT - PERSON TAUGHT/METHOD
RD encouraged continued PO intake as tolerated for post-op healing. Reviewed consistent carbohydrate and heart healthy diet recommendations including pairing CHO with protein, examples of lean protein, limiting foods high in added sugar. Pt verbalized appreciation and understanding./verbal instruction/patient instructed

## 2025-07-15 NOTE — DIETITIAN INITIAL EVALUATION ADULT - OTHER CALCULATIONS
pounds, %  IBW for EER as %IBW>100% in ICU setting. Needs adjusted for advanced age, post-op healing, BMI>30.

## 2025-07-15 NOTE — DIETITIAN INITIAL EVALUATION ADULT - PERTINENT LABORATORY DATA
07-15    136  |  101  |  20  ----------------------------<  142[H]  4.2   |  22  |  1.09    Ca    9.0      15 Jul 2025 03:13  Phos  4.7     07-15  Mg     2.3     07-15    TPro  6.0  /  Alb  3.9  /  TBili  0.6  /  DBili  x   /  AST  23  /  ALT  12  /  AlkPhos  63  07-15  POCT Blood Glucose.: 134 mg/dL (07-15-25 @ 06:20)  A1C with Estimated Average Glucose Result: 6.1 % (07-09-25 @ 14:57)  A1C with Estimated Average Glucose Result: 6.1 % (02-10-25 @ 06:54)

## 2025-07-15 NOTE — PHYSICAL THERAPY INITIAL EVALUATION ADULT - PERTINENT HX OF CURRENT PROBLEM, REHAB EVAL
74-year-old male with a past medical hx of CAD s/p PCI to RCA 3 years ago, RA, HTN, HLD, pre-DM, BPH, VIMAL, overactive bladder, who presents for evaluation and management of anomalous RCA and aortic stenosis. Patient reports mild CP at rest (2/10), however with exertion the CP increases 6/10. Also reports some MARIEE when walking one block that has been progressively worsening since 2019. Patient is referred by Heaven Brewer MD. **unable to stent coronary anomaly. CCS III. NYHA Class III.

## 2025-07-15 NOTE — DIETITIAN INITIAL EVALUATION ADULT - NSFNSGIIOFT_GEN_A_CORE
07-14-25 @ 07:01  -  07-15-25 @ 07:00  --------------------------------------------------------  OUT:    Nasogastric/Oral tube (mL): 0 mL  Total OUT: 0 mL    Total NET: 0 mL

## 2025-07-15 NOTE — DIETITIAN INITIAL EVALUATION ADULT - ADD RECOMMEND
1. Continue current diet order  2. Encourage and monitor PO intake, honor preferences as able   >> Consistently meet >75% of estimated needs during admission   >> Consider oral nutrition supplement or liberalizing diet should intake decline </= 50%   3. Monitor wt trends, GI function, skin integrity  4. Monitor lytes, renal indices, blood glucose, LFTs    5. Pain and bowel regimen per team

## 2025-07-15 NOTE — DIETITIAN INITIAL EVALUATION ADULT - PERTINENT MEDS FT
MEDICATIONS  (STANDING):  acetaminophen   IVPB .. 1000 milliGRAM(s) IV Intermittent every 6 hours  ascorbic acid 500 milliGRAM(s) Oral two times a day  aspirin enteric coated 81 milliGRAM(s) Oral daily  atorvastatin 40 milliGRAM(s) Oral at bedtime  calcium gluconate IVPB 2 Gram(s) IV Intermittent once  ceFAZolin   IVPB 2000 milliGRAM(s) IV Intermittent every 8 hours  chlorhexidine 2% Cloths 1 Application(s) Topical daily  clopidogrel Tablet 75 milliGRAM(s) Oral daily  dextrose 5%. 1000 milliLiter(s) (50 mL/Hr) IV Continuous <Continuous>  dextrose 5%. 1000 milliLiter(s) (100 mL/Hr) IV Continuous <Continuous>  dextrose 50% Injectable 50 milliLiter(s) IV Push every 15 minutes  dextrose 50% Injectable 25 milliLiter(s) IV Push every 15 minutes  gabapentin 100 milliGRAM(s) Oral every 8 hours  glucagon  Injectable 1 milliGRAM(s) IntraMuscular once  heparin   Injectable 7500 Unit(s) SubCutaneous every 8 hours  insulin lispro (ADMELOG) corrective regimen sliding scale   SubCutaneous Before meals and at bedtime  latanoprost 0.005% Ophthalmic Solution 1 Drop(s) Both EYES at bedtime  mupirocin 2% Ointment 1 Application(s) Both Nostrils two times a day  pantoprazole    Tablet 40 milliGRAM(s) Oral before breakfast  pantoprazole    Tablet 40 milliGRAM(s) Oral once  polyethylene glycol 3350 17 Gram(s) Oral daily  pregabalin 50 milliGRAM(s) Oral daily  senna 2 Tablet(s) Oral at bedtime  sodium chloride 0.9% lock flush 3 milliLiter(s) IV Push every 8 hours    MEDICATIONS  (PRN):  dextrose Oral Gel 15 Gram(s) Oral once PRN Blood Glucose LESS THAN 70 milliGRAM(s)/deciliter  oxyCODONE    IR 5 milliGRAM(s) Oral every 6 hours PRN Severe Pain (7 - 10)

## 2025-07-15 NOTE — DIETITIAN INITIAL EVALUATION ADULT - OTHER INFO
74-year-old male with a past medical hx of CAD s/p PCI to RCA 3 years ago, RA, HTN, HLD, pre-DM, BPH, VIMAL, overactive bladder, who presents for evaluation and management of anomalous RCA and aortic stenosis. 7/14 OPCAB    Pt seen for nutrition assessment. Resting in chair on room air, MAP>65, no drips at present. Ordered for Consistent Carbohydrate diet. Pt confirms no known food allergies and denies difficulty chewing or swallowing. Pt notes he does not eat pork- documented in [facility]. Reports fair appetite s/p OR, consumed eggs, pancake, and turkey del rosario for breakfast. PTA, pt reports good appetite and intake at baseline. No issues obtaining/procuring food. Reports a usual body weight of ~213 pounds x ~2 months ago and reports intentional weight loss. Notes he has been exercising more. Observed with no overt signs or symptoms of muscle or fat wasting. Based on ASPEN guidelines, pt does not meet criteria for malnutrition at this time. Labs and medications reviewed. Electrolytes WNL, glucose 122-156 x 24 hours, HgbA1c 6.1% (high, 7/9/25), lipid panel WNL. Ordered for IV abx, insulin corrective sliding scale, calcium gluconate, vitamin C, bowel regimen. Skin: surgical incision R leg and anterior medial chest; no pressure injuries or edema documented. GI: no report of nausea, vomiting, diarrhea, constipation. See nutrition recommendations below.

## 2025-07-16 LAB
ALBUMIN SERPL ELPH-MCNC: 4.3 G/DL — SIGNIFICANT CHANGE UP (ref 3.3–5)
ALP SERPL-CCNC: 51 U/L — SIGNIFICANT CHANGE UP (ref 40–120)
ALT FLD-CCNC: 11 U/L — SIGNIFICANT CHANGE UP (ref 10–45)
ANION GAP SERPL CALC-SCNC: 11 MMOL/L — SIGNIFICANT CHANGE UP (ref 5–17)
ANION GAP SERPL CALC-SCNC: 12 MMOL/L — SIGNIFICANT CHANGE UP (ref 5–17)
APTT BLD: 29.4 SEC — SIGNIFICANT CHANGE UP (ref 26.1–36.8)
AST SERPL-CCNC: 34 U/L — SIGNIFICANT CHANGE UP (ref 10–40)
BASOPHILS # BLD AUTO: 0.02 K/UL — SIGNIFICANT CHANGE UP (ref 0–0.2)
BASOPHILS NFR BLD AUTO: 0.2 % — SIGNIFICANT CHANGE UP (ref 0–2)
BILIRUB SERPL-MCNC: 0.6 MG/DL — SIGNIFICANT CHANGE UP (ref 0.2–1.2)
BUN SERPL-MCNC: 25 MG/DL — HIGH (ref 7–23)
BUN SERPL-MCNC: 27 MG/DL — HIGH (ref 7–23)
CALCIUM SERPL-MCNC: 8.6 MG/DL — SIGNIFICANT CHANGE UP (ref 8.4–10.5)
CALCIUM SERPL-MCNC: 9 MG/DL — SIGNIFICANT CHANGE UP (ref 8.4–10.5)
CHLORIDE SERPL-SCNC: 102 MMOL/L — SIGNIFICANT CHANGE UP (ref 96–108)
CHLORIDE SERPL-SCNC: 103 MMOL/L — SIGNIFICANT CHANGE UP (ref 96–108)
CO2 SERPL-SCNC: 24 MMOL/L — SIGNIFICANT CHANGE UP (ref 22–31)
CO2 SERPL-SCNC: 26 MMOL/L — SIGNIFICANT CHANGE UP (ref 22–31)
CREAT SERPL-MCNC: 1.21 MG/DL — SIGNIFICANT CHANGE UP (ref 0.5–1.3)
CREAT SERPL-MCNC: 1.36 MG/DL — HIGH (ref 0.5–1.3)
EGFR: 55 ML/MIN/1.73M2 — LOW
EGFR: 55 ML/MIN/1.73M2 — LOW
EGFR: 63 ML/MIN/1.73M2 — SIGNIFICANT CHANGE UP
EGFR: 63 ML/MIN/1.73M2 — SIGNIFICANT CHANGE UP
EOSINOPHIL # BLD AUTO: 0.02 K/UL — SIGNIFICANT CHANGE UP (ref 0–0.5)
EOSINOPHIL NFR BLD AUTO: 0.2 % — SIGNIFICANT CHANGE UP (ref 0–6)
GLUCOSE SERPL-MCNC: 126 MG/DL — HIGH (ref 70–99)
GLUCOSE SERPL-MCNC: 140 MG/DL — HIGH (ref 70–99)
HCT VFR BLD CALC: 30.2 % — LOW (ref 39–50)
HGB BLD-MCNC: 9.9 G/DL — LOW (ref 13–17)
IMM GRANULOCYTES # BLD AUTO: 0.06 K/UL — SIGNIFICANT CHANGE UP (ref 0–0.07)
IMM GRANULOCYTES NFR BLD AUTO: 0.5 % — SIGNIFICANT CHANGE UP (ref 0–0.9)
INR BLD: 1.14 — SIGNIFICANT CHANGE UP (ref 0.85–1.16)
LYMPHOCYTES # BLD AUTO: 1.97 K/UL — SIGNIFICANT CHANGE UP (ref 1–3.3)
LYMPHOCYTES NFR BLD AUTO: 15.7 % — SIGNIFICANT CHANGE UP (ref 13–44)
MAGNESIUM SERPL-MCNC: 2.3 MG/DL — SIGNIFICANT CHANGE UP (ref 1.6–2.6)
MAGNESIUM SERPL-MCNC: 2.4 MG/DL — SIGNIFICANT CHANGE UP (ref 1.6–2.6)
MCHC RBC-ENTMCNC: 29.6 PG — SIGNIFICANT CHANGE UP (ref 27–34)
MCHC RBC-ENTMCNC: 32.8 G/DL — SIGNIFICANT CHANGE UP (ref 32–36)
MCV RBC AUTO: 90.1 FL — SIGNIFICANT CHANGE UP (ref 80–100)
MONOCYTES # BLD AUTO: 1.75 K/UL — HIGH (ref 0–0.9)
MONOCYTES NFR BLD AUTO: 14 % — SIGNIFICANT CHANGE UP (ref 2–14)
NEUTROPHILS # BLD AUTO: 8.69 K/UL — HIGH (ref 1.8–7.4)
NEUTROPHILS NFR BLD AUTO: 69.4 % — SIGNIFICANT CHANGE UP (ref 43–77)
NRBC # BLD AUTO: 0 K/UL — SIGNIFICANT CHANGE UP (ref 0–0)
NRBC # FLD: 0 K/UL — SIGNIFICANT CHANGE UP (ref 0–0)
NRBC BLD AUTO-RTO: 0 /100 WBCS — SIGNIFICANT CHANGE UP (ref 0–0)
PHOSPHATE SERPL-MCNC: 3 MG/DL — SIGNIFICANT CHANGE UP (ref 2.5–4.5)
PLATELET # BLD AUTO: 175 K/UL — SIGNIFICANT CHANGE UP (ref 150–400)
PMV BLD: 10.5 FL — SIGNIFICANT CHANGE UP (ref 7–13)
POTASSIUM SERPL-MCNC: 3.8 MMOL/L — SIGNIFICANT CHANGE UP (ref 3.5–5.3)
POTASSIUM SERPL-MCNC: 4.4 MMOL/L — SIGNIFICANT CHANGE UP (ref 3.5–5.3)
POTASSIUM SERPL-SCNC: 3.8 MMOL/L — SIGNIFICANT CHANGE UP (ref 3.5–5.3)
POTASSIUM SERPL-SCNC: 4.4 MMOL/L — SIGNIFICANT CHANGE UP (ref 3.5–5.3)
PROT SERPL-MCNC: 6.2 G/DL — SIGNIFICANT CHANGE UP (ref 6–8.3)
PROTHROM AB SERPL-ACNC: 13.3 SEC — SIGNIFICANT CHANGE UP (ref 9.9–13.4)
RBC # BLD: 3.35 M/UL — LOW (ref 4.2–5.8)
RBC # FLD: 13 % — SIGNIFICANT CHANGE UP (ref 10.3–14.5)
SODIUM SERPL-SCNC: 138 MMOL/L — SIGNIFICANT CHANGE UP (ref 135–145)
SODIUM SERPL-SCNC: 140 MMOL/L — SIGNIFICANT CHANGE UP (ref 135–145)
WBC # BLD: 12.51 K/UL — HIGH (ref 3.8–10.5)
WBC # FLD AUTO: 12.51 K/UL — HIGH (ref 3.8–10.5)

## 2025-07-16 PROCEDURE — 36415 COLL VENOUS BLD VENIPUNCTURE: CPT

## 2025-07-16 PROCEDURE — 86900 BLOOD TYPING SEROLOGIC ABO: CPT

## 2025-07-16 PROCEDURE — P9045: CPT

## 2025-07-16 PROCEDURE — 86901 BLOOD TYPING SEROLOGIC RH(D): CPT

## 2025-07-16 PROCEDURE — 85014 HEMATOCRIT: CPT

## 2025-07-16 PROCEDURE — 85610 PROTHROMBIN TIME: CPT

## 2025-07-16 PROCEDURE — 94640 AIRWAY INHALATION TREATMENT: CPT

## 2025-07-16 PROCEDURE — 71045 X-RAY EXAM CHEST 1 VIEW: CPT | Mod: 26,76

## 2025-07-16 PROCEDURE — 80048 BASIC METABOLIC PNL TOTAL CA: CPT

## 2025-07-16 PROCEDURE — 94002 VENT MGMT INPAT INIT DAY: CPT

## 2025-07-16 PROCEDURE — 86923 COMPATIBILITY TEST ELECTRIC: CPT

## 2025-07-16 PROCEDURE — 86850 RBC ANTIBODY SCREEN: CPT

## 2025-07-16 PROCEDURE — 84295 ASSAY OF SERUM SODIUM: CPT

## 2025-07-16 PROCEDURE — 85025 COMPLETE CBC W/AUTO DIFF WBC: CPT

## 2025-07-16 PROCEDURE — 82962 GLUCOSE BLOOD TEST: CPT

## 2025-07-16 PROCEDURE — 82803 BLOOD GASES ANY COMBINATION: CPT

## 2025-07-16 PROCEDURE — 97163 PT EVAL HIGH COMPLEX 45 MIN: CPT

## 2025-07-16 PROCEDURE — 85730 THROMBOPLASTIN TIME PARTIAL: CPT

## 2025-07-16 PROCEDURE — 84100 ASSAY OF PHOSPHORUS: CPT

## 2025-07-16 PROCEDURE — 83735 ASSAY OF MAGNESIUM: CPT

## 2025-07-16 PROCEDURE — 83605 ASSAY OF LACTIC ACID: CPT

## 2025-07-16 PROCEDURE — 82330 ASSAY OF CALCIUM: CPT

## 2025-07-16 PROCEDURE — 84132 ASSAY OF SERUM POTASSIUM: CPT

## 2025-07-16 PROCEDURE — 82947 ASSAY GLUCOSE BLOOD QUANT: CPT

## 2025-07-16 PROCEDURE — 71045 X-RAY EXAM CHEST 1 VIEW: CPT

## 2025-07-16 PROCEDURE — 80053 COMPREHEN METABOLIC PANEL: CPT

## 2025-07-16 RX ORDER — FUROSEMIDE 10 MG/ML
20 INJECTION INTRAMUSCULAR; INTRAVENOUS ONCE
Refills: 0 | Status: DISCONTINUED | OUTPATIENT
Start: 2025-07-16 | End: 2025-07-16

## 2025-07-16 RX ORDER — FUROSEMIDE 10 MG/ML
20 INJECTION INTRAMUSCULAR; INTRAVENOUS ONCE
Refills: 0 | Status: COMPLETED | OUTPATIENT
Start: 2025-07-16 | End: 2025-07-16

## 2025-07-16 RX ORDER — AMIODARONE HYDROCHLORIDE 50 MG/ML
150 INJECTION, SOLUTION INTRAVENOUS ONCE
Refills: 0 | Status: COMPLETED | OUTPATIENT
Start: 2025-07-16 | End: 2025-07-16

## 2025-07-16 RX ADMIN — ATORVASTATIN CALCIUM 40 MILLIGRAM(S): 80 TABLET, FILM COATED ORAL at 22:19

## 2025-07-16 RX ADMIN — Medication 1000 MILLIGRAM(S): at 07:02

## 2025-07-16 RX ADMIN — Medication 81 MILLIGRAM(S): at 11:37

## 2025-07-16 RX ADMIN — HEPARIN SODIUM 7500 UNIT(S): 1000 INJECTION INTRAVENOUS; SUBCUTANEOUS at 05:45

## 2025-07-16 RX ADMIN — GABAPENTIN 100 MILLIGRAM(S): 400 CAPSULE ORAL at 22:19

## 2025-07-16 RX ADMIN — Medication 1000 MILLIGRAM(S): at 06:15

## 2025-07-16 RX ADMIN — Medication 2 TABLET(S): at 22:19

## 2025-07-16 RX ADMIN — Medication 1000 MILLIGRAM(S): at 11:37

## 2025-07-16 RX ADMIN — Medication 500 MILLIGRAM(S): at 17:46

## 2025-07-16 RX ADMIN — Medication 20 MILLIEQUIVALENT(S): at 14:32

## 2025-07-16 RX ADMIN — Medication 3 MILLILITER(S): at 14:12

## 2025-07-16 RX ADMIN — HEPARIN SODIUM 7500 UNIT(S): 1000 INJECTION INTRAVENOUS; SUBCUTANEOUS at 14:32

## 2025-07-16 RX ADMIN — Medication 500 MILLIGRAM(S): at 05:45

## 2025-07-16 RX ADMIN — MUPIROCIN CALCIUM 1 APPLICATION(S): 20 CREAM TOPICAL at 05:46

## 2025-07-16 RX ADMIN — Medication 3 MILLILITER(S): at 05:52

## 2025-07-16 RX ADMIN — Medication 40 MILLIGRAM(S): at 06:01

## 2025-07-16 RX ADMIN — Medication 3 MILLILITER(S): at 21:09

## 2025-07-16 RX ADMIN — GABAPENTIN 100 MILLIGRAM(S): 400 CAPSULE ORAL at 05:46

## 2025-07-16 RX ADMIN — AMIODARONE HYDROCHLORIDE 150 MILLIGRAM(S): 50 INJECTION, SOLUTION INTRAVENOUS at 19:27

## 2025-07-16 RX ADMIN — Medication 100 MILLIGRAM(S): at 05:45

## 2025-07-16 RX ADMIN — Medication 1000 MILLIGRAM(S): at 17:46

## 2025-07-16 RX ADMIN — GABAPENTIN 100 MILLIGRAM(S): 400 CAPSULE ORAL at 14:32

## 2025-07-16 RX ADMIN — Medication 1000 MILLIGRAM(S): at 19:00

## 2025-07-16 RX ADMIN — MUPIROCIN CALCIUM 1 APPLICATION(S): 20 CREAM TOPICAL at 17:20

## 2025-07-16 RX ADMIN — CLOPIDOGREL BISULFATE 75 MILLIGRAM(S): 75 TABLET, FILM COATED ORAL at 11:38

## 2025-07-16 RX ADMIN — Medication 20 MILLIEQUIVALENT(S): at 10:02

## 2025-07-16 RX ADMIN — Medication 40 MILLIGRAM(S): at 05:46

## 2025-07-16 RX ADMIN — LATANOPROST PF 1 DROP(S): 0.05 SOLUTION/ DROPS OPHTHALMIC at 23:28

## 2025-07-16 RX ADMIN — FUROSEMIDE 20 MILLIGRAM(S): 10 INJECTION INTRAMUSCULAR; INTRAVENOUS at 14:31

## 2025-07-16 RX ADMIN — PREGABALIN 50 MILLIGRAM(S): 50 CAPSULE ORAL at 11:37

## 2025-07-16 RX ADMIN — HEPARIN SODIUM 7500 UNIT(S): 1000 INJECTION INTRAVENOUS; SUBCUTANEOUS at 22:19

## 2025-07-16 RX ADMIN — Medication 1000 MILLIGRAM(S): at 11:30

## 2025-07-16 RX ADMIN — Medication 1000 MILLIGRAM(S): at 23:43

## 2025-07-16 RX ADMIN — POLYETHYLENE GLYCOL 3350 17 GRAM(S): 17 POWDER, FOR SOLUTION ORAL at 11:38

## 2025-07-16 NOTE — CHART NOTE - NSCHARTNOTEFT_GEN_A_CORE
Pt exhibiting intrinsic heart rate and rhythm.  Per Dr. Gerber pacing wires removed at bedside after cleaning in usual sterile fashion.  No acute issues.  Pt tolerated the procedure well. Nursing staff was notified. Pt remain to bed rest and blood pressure was checked [id number] hrs, Q30min for the last two hours.

## 2025-07-16 NOTE — PROGRESS NOTE ADULT - ASSESSMENT
75 yo male with PMHx of CAD (s/p PCI to RCA 2022), RA, HTN, HLD, pre-DM, BPH, VIMAL, overactive bladder who was referred to Dr. Gerber by Dr. Heaven Brewer for treatment of anomalous RCA, moderate AS and RCA CAD. Patient presented to [facility] on 7/14/25 and underwent a CAMB x1 (SVG to PDA) with Dr. Perez. Arrived to CTICU intubated, but extubated in the short course. 7/15 mtz removed, passed TOV. Iman removed. Transferred to floor. POD 2 overnight, patient pulled at wires and drains. PW removed. Drains remain with >100 cc output. 1x lasix 20 gave for diuresis. Patient with 1st degree AV block, holding AV ector blockers     Neuro:   No delirium and focal deficits.   Pain: prn oxy and tylenol    - standing gabapentin and lyrica       Cardio:  POD 2 OPCABG x1    - c/w ASA and plavix    - 3x drains in place with >100 cc output this morning   HTN: holding AV ector blockers, patient with 1st degree   Vitals over the last 24 hrs:    HR: 80-90    BP: 118-120     Pulm:   IS encouraged. Sating at 98% on RA   CXR: stable     GI:   Tolerating diet well   Prophylaxis: 40 mg pantoprazole.   Bowel: Senna and PEG.      ID:   WBC 12. Afebrile.  Monitor fever curve     Renal:   BUN/Creat @ 25/1.21   - stable   Electrolytes: Replete electrolytes prn.     Hem:   H&H @ 9.9/30  DVT prophylaxis: SubQ Heparin     Endo:   A1C: 6.1  TSH: 1.5    MSK:   Ambulating well. Continue with PT/OT.    Disposition:   Continue with inpatient care.  75 yo male with PMHx of CAD (s/p PCI to RCA 2022), RA, HTN, HLD, pre-DM, BPH, VIMAL, overactive bladder who was referred to Dr. Gerber by Dr. Heaven Brewer for treatment of anomalous RCA, moderate AS and RCA CAD. Patient presented to [facility] on 7/14/25 and underwent a CAMB x1 (SVG to PDA) with Dr. Perez. Arrived to CTICU intubated, but extubated in the short course. 7/15 mtz removed, passed TOV. Iman removed. Transferred to floor. POD 2 overnight, patient pulled at wires and drains. PW removed. Drains remain with >100 cc output. 1x lasix 20 gave for diuresis. Patient with 1st degree AV block, holding AV ector blockers     Neuro:   No delirium and focal deficits.   Pain: prn oxy and tylenol    - standing gabapentin and lyrica       Cardio:  POD 2 OPCABG x1    - c/w ASA and plavix    - 3x drains in place with >100 cc output this morning   HTN: holding AV ector blockers, patient with 1st degree   Vitals over the last 24 hrs:    HR: 80-90    BP: 118-120     Pulm:   IS encouraged. Sating at 98% on RA   CXR: stable     GI:   Tolerating diet well   Prophylaxis: 40 mg pantoprazole.   Bowel: Senna and PEG.      ID:   WBC 12. Afebrile.  Monitor fever curve     Renal:   BUN/Creat @ 25/1.21   - stable   Electrolytes: Replete electrolytes prn.     Hem:   H&H @ 9.9/30  DVT prophylaxis: SubQ Heparin     Endo:   Prediabetes    A1C: 6.1   ISS and lyrica   TSH: 1.5    MSK:   Ambulating well. Continue with PT/OT.    Disposition:   Continue with inpatient care.

## 2025-07-16 NOTE — CHART NOTE - NSCHARTNOTEFT_GEN_A_CORE
Called to patient's bedside by nursing staff.  Patient was found with blakes disconnected from bulbs and bloody drainage on bed and floor.  Per patient, he has been urinating a lot overnight.  He thought it was urine and was tugging at the drains and his pacing wire.  He says he doesn't really know what happened.  Blakes drains thoroughly cleaned with chloraprep and new bulbs applied.  Patient hemodynamically stable through out.  Pending cxr.  Drains and wires covered with gauze and tape.

## 2025-07-17 PROBLEM — Q24.5 MALFORMATION OF CORONARY VESSELS: Chronic | Status: ACTIVE | Noted: 2025-07-10

## 2025-07-17 LAB
ALBUMIN SERPL ELPH-MCNC: 4.1 G/DL — SIGNIFICANT CHANGE UP (ref 3.3–5)
ALP SERPL-CCNC: 49 U/L — SIGNIFICANT CHANGE UP (ref 40–120)
ALT FLD-CCNC: 7 U/L — LOW (ref 10–45)
ANION GAP SERPL CALC-SCNC: 12 MMOL/L — SIGNIFICANT CHANGE UP (ref 5–17)
APTT BLD: 30.9 SEC — SIGNIFICANT CHANGE UP (ref 26.1–36.8)
AST SERPL-CCNC: 26 U/L — SIGNIFICANT CHANGE UP (ref 10–40)
BILIRUB SERPL-MCNC: 0.6 MG/DL — SIGNIFICANT CHANGE UP (ref 0.2–1.2)
BUN SERPL-MCNC: 22 MG/DL — SIGNIFICANT CHANGE UP (ref 7–23)
CALCIUM SERPL-MCNC: 8.9 MG/DL — SIGNIFICANT CHANGE UP (ref 8.4–10.5)
CHLORIDE SERPL-SCNC: 100 MMOL/L — SIGNIFICANT CHANGE UP (ref 96–108)
CO2 SERPL-SCNC: 26 MMOL/L — SIGNIFICANT CHANGE UP (ref 22–31)
CREAT SERPL-MCNC: 1.17 MG/DL — SIGNIFICANT CHANGE UP (ref 0.5–1.3)
EGFR: 65 ML/MIN/1.73M2 — SIGNIFICANT CHANGE UP
EGFR: 65 ML/MIN/1.73M2 — SIGNIFICANT CHANGE UP
GLUCOSE SERPL-MCNC: 105 MG/DL — HIGH (ref 70–99)
HCT VFR BLD CALC: 28.7 % — LOW (ref 39–50)
HGB BLD-MCNC: 9.2 G/DL — LOW (ref 13–17)
INR BLD: 1.07 — SIGNIFICANT CHANGE UP (ref 0.85–1.16)
MAGNESIUM SERPL-MCNC: 2.4 MG/DL — SIGNIFICANT CHANGE UP (ref 1.6–2.6)
MCHC RBC-ENTMCNC: 29.4 PG — SIGNIFICANT CHANGE UP (ref 27–34)
MCHC RBC-ENTMCNC: 32.1 G/DL — SIGNIFICANT CHANGE UP (ref 32–36)
MCV RBC AUTO: 91.7 FL — SIGNIFICANT CHANGE UP (ref 80–100)
NRBC # BLD AUTO: 0 K/UL — SIGNIFICANT CHANGE UP (ref 0–0)
NRBC # FLD: 0 K/UL — SIGNIFICANT CHANGE UP (ref 0–0)
NRBC BLD AUTO-RTO: 0 /100 WBCS — SIGNIFICANT CHANGE UP (ref 0–0)
PLATELET # BLD AUTO: 170 K/UL — SIGNIFICANT CHANGE UP (ref 150–400)
PMV BLD: 10.6 FL — SIGNIFICANT CHANGE UP (ref 7–13)
POTASSIUM SERPL-MCNC: 4.3 MMOL/L — SIGNIFICANT CHANGE UP (ref 3.5–5.3)
POTASSIUM SERPL-SCNC: 4.3 MMOL/L — SIGNIFICANT CHANGE UP (ref 3.5–5.3)
PROT SERPL-MCNC: 6.3 G/DL — SIGNIFICANT CHANGE UP (ref 6–8.3)
PROTHROM AB SERPL-ACNC: 12.5 SEC — SIGNIFICANT CHANGE UP (ref 9.9–13.4)
RBC # BLD: 3.13 M/UL — LOW (ref 4.2–5.8)
RBC # FLD: 13.4 % — SIGNIFICANT CHANGE UP (ref 10.3–14.5)
SODIUM SERPL-SCNC: 138 MMOL/L — SIGNIFICANT CHANGE UP (ref 135–145)
WBC # BLD: 9.57 K/UL — SIGNIFICANT CHANGE UP (ref 3.8–10.5)
WBC # FLD AUTO: 9.57 K/UL — SIGNIFICANT CHANGE UP (ref 3.8–10.5)

## 2025-07-17 PROCEDURE — 71045 X-RAY EXAM CHEST 1 VIEW: CPT | Mod: 26,76

## 2025-07-17 PROCEDURE — 36415 COLL VENOUS BLD VENIPUNCTURE: CPT

## 2025-07-17 PROCEDURE — 85025 COMPLETE CBC W/AUTO DIFF WBC: CPT

## 2025-07-17 PROCEDURE — 85014 HEMATOCRIT: CPT

## 2025-07-17 PROCEDURE — 85027 COMPLETE CBC AUTOMATED: CPT

## 2025-07-17 PROCEDURE — 83735 ASSAY OF MAGNESIUM: CPT

## 2025-07-17 PROCEDURE — 84295 ASSAY OF SERUM SODIUM: CPT

## 2025-07-17 PROCEDURE — 84132 ASSAY OF SERUM POTASSIUM: CPT

## 2025-07-17 PROCEDURE — 71045 X-RAY EXAM CHEST 1 VIEW: CPT

## 2025-07-17 PROCEDURE — 82962 GLUCOSE BLOOD TEST: CPT

## 2025-07-17 PROCEDURE — 80048 BASIC METABOLIC PNL TOTAL CA: CPT

## 2025-07-17 PROCEDURE — 82330 ASSAY OF CALCIUM: CPT

## 2025-07-17 PROCEDURE — 85730 THROMBOPLASTIN TIME PARTIAL: CPT

## 2025-07-17 PROCEDURE — 94002 VENT MGMT INPAT INIT DAY: CPT

## 2025-07-17 PROCEDURE — 94640 AIRWAY INHALATION TREATMENT: CPT

## 2025-07-17 PROCEDURE — P9045: CPT

## 2025-07-17 PROCEDURE — 86901 BLOOD TYPING SEROLOGIC RH(D): CPT

## 2025-07-17 PROCEDURE — 84100 ASSAY OF PHOSPHORUS: CPT

## 2025-07-17 PROCEDURE — 85610 PROTHROMBIN TIME: CPT

## 2025-07-17 PROCEDURE — 97163 PT EVAL HIGH COMPLEX 45 MIN: CPT

## 2025-07-17 PROCEDURE — 82803 BLOOD GASES ANY COMBINATION: CPT

## 2025-07-17 PROCEDURE — 86900 BLOOD TYPING SEROLOGIC ABO: CPT

## 2025-07-17 PROCEDURE — 86923 COMPATIBILITY TEST ELECTRIC: CPT

## 2025-07-17 PROCEDURE — 86850 RBC ANTIBODY SCREEN: CPT

## 2025-07-17 PROCEDURE — 83605 ASSAY OF LACTIC ACID: CPT

## 2025-07-17 PROCEDURE — 82947 ASSAY GLUCOSE BLOOD QUANT: CPT

## 2025-07-17 PROCEDURE — 80053 COMPREHEN METABOLIC PANEL: CPT

## 2025-07-17 RX ORDER — MAGNESIUM HYDROXIDE 400 MG/5ML
30 SUSPENSION ORAL ONCE
Refills: 0 | Status: COMPLETED | OUTPATIENT
Start: 2025-07-17 | End: 2025-07-17

## 2025-07-17 RX ORDER — FUROSEMIDE 10 MG/ML
20 INJECTION INTRAMUSCULAR; INTRAVENOUS ONCE
Refills: 0 | Status: COMPLETED | OUTPATIENT
Start: 2025-07-17 | End: 2025-07-17

## 2025-07-17 RX ADMIN — HEPARIN SODIUM 7500 UNIT(S): 1000 INJECTION INTRAVENOUS; SUBCUTANEOUS at 15:53

## 2025-07-17 RX ADMIN — Medication 3 MILLILITER(S): at 15:36

## 2025-07-17 RX ADMIN — Medication 1 APPLICATION(S): at 12:31

## 2025-07-17 RX ADMIN — FUROSEMIDE 20 MILLIGRAM(S): 10 INJECTION INTRAMUSCULAR; INTRAVENOUS at 09:51

## 2025-07-17 RX ADMIN — CLOPIDOGREL BISULFATE 75 MILLIGRAM(S): 75 TABLET, FILM COATED ORAL at 12:31

## 2025-07-17 RX ADMIN — Medication 1000 MILLIGRAM(S): at 18:30

## 2025-07-17 RX ADMIN — Medication 1000 MILLIGRAM(S): at 00:13

## 2025-07-17 RX ADMIN — Medication 1000 MILLIGRAM(S): at 13:00

## 2025-07-17 RX ADMIN — Medication 81 MILLIGRAM(S): at 12:31

## 2025-07-17 RX ADMIN — Medication 1000 MILLIGRAM(S): at 12:31

## 2025-07-17 RX ADMIN — Medication 2 TABLET(S): at 21:48

## 2025-07-17 RX ADMIN — MAGNESIUM HYDROXIDE 30 MILLILITER(S): 400 SUSPENSION ORAL at 15:53

## 2025-07-17 RX ADMIN — GABAPENTIN 100 MILLIGRAM(S): 400 CAPSULE ORAL at 06:57

## 2025-07-17 RX ADMIN — Medication 500 MILLIGRAM(S): at 06:57

## 2025-07-17 RX ADMIN — Medication 3 MILLILITER(S): at 06:33

## 2025-07-17 RX ADMIN — HEPARIN SODIUM 7500 UNIT(S): 1000 INJECTION INTRAVENOUS; SUBCUTANEOUS at 06:56

## 2025-07-17 RX ADMIN — Medication 500 MILLIGRAM(S): at 18:29

## 2025-07-17 RX ADMIN — MUPIROCIN CALCIUM 1 APPLICATION(S): 20 CREAM TOPICAL at 18:29

## 2025-07-17 RX ADMIN — GABAPENTIN 100 MILLIGRAM(S): 400 CAPSULE ORAL at 21:48

## 2025-07-17 RX ADMIN — OXYCODONE HYDROCHLORIDE 5 MILLIGRAM(S): 30 TABLET ORAL at 13:00

## 2025-07-17 RX ADMIN — LATANOPROST PF 1 DROP(S): 0.05 SOLUTION/ DROPS OPHTHALMIC at 21:48

## 2025-07-17 RX ADMIN — Medication 1000 MILLIGRAM(S): at 18:44

## 2025-07-17 RX ADMIN — Medication 1000 MILLIGRAM(S): at 05:40

## 2025-07-17 RX ADMIN — POLYETHYLENE GLYCOL 3350 17 GRAM(S): 17 POWDER, FOR SOLUTION ORAL at 12:32

## 2025-07-17 RX ADMIN — Medication 40 MILLIGRAM(S): at 06:56

## 2025-07-17 RX ADMIN — PREGABALIN 50 MILLIGRAM(S): 50 CAPSULE ORAL at 12:34

## 2025-07-17 RX ADMIN — Medication 1000 MILLIGRAM(S): at 05:10

## 2025-07-17 RX ADMIN — Medication 3 MILLILITER(S): at 22:19

## 2025-07-17 RX ADMIN — HEPARIN SODIUM 7500 UNIT(S): 1000 INJECTION INTRAVENOUS; SUBCUTANEOUS at 21:48

## 2025-07-17 RX ADMIN — ATORVASTATIN CALCIUM 40 MILLIGRAM(S): 80 TABLET, FILM COATED ORAL at 21:48

## 2025-07-17 RX ADMIN — GABAPENTIN 100 MILLIGRAM(S): 400 CAPSULE ORAL at 15:56

## 2025-07-17 RX ADMIN — OXYCODONE HYDROCHLORIDE 5 MILLIGRAM(S): 30 TABLET ORAL at 12:34

## 2025-07-17 RX ADMIN — MUPIROCIN CALCIUM 1 APPLICATION(S): 20 CREAM TOPICAL at 06:57

## 2025-07-17 NOTE — PROGRESS NOTE ADULT - ASSESSMENT
73 yo male with PMHx of CAD (s/p PCI to RCA 2022), RA, HTN, HLD, pre-DM, BPH, VIMAL, overactive bladder who was referred to Dr. Gerber by Dr. Heaven Brewer for treatment of anomalous RCA, moderate AS and RCA CAD. Patient presented to [facility] on 7/14/25 and underwent a CAMB x1 (SVG to PDA) with Dr. Perez. Arrived to CTICU intubated, but extubated in the short course. 7/15 mtz removed, passed TOV. Iman removed. Transferred to floor. POD 2 overnight, patient pulled at wires and drains. PW removed. Patient went into flow Afib, converted out with 1x amio bolus. Holding BB iso baseline 1st degree heart block.       Neuro:   No delirium and focal deficits.   Pain: prn oxy and tylenol    - standing gabapentin and lyrica       Cardio:  POD 2 OPCABG x1    - c/w ASA and plavix    - 1x drain in place, >250 c output 24 hrs.   HTN: holding AV ector blockers, patient with 1st degree   Vitals over the last 24 hrs:    HR: 80-90    BP: 118-120     Pulm:   IS encouraged. Sating at 98% on RA   CXR: stable     GI:   Tolerating diet well   Prophylaxis: 40 mg pantoprazole.   Bowel: Senna and PEG.      ID:   WBC 9. Afebrile.  Monitor fever curve     Renal:   BUN/Creat @ 22/1.17   - stable   Electrolytes: Replete electrolytes prn.     Hem:   H&H @ 9.2/28  DVT prophylaxis: SubQ Heparin     Endo:   Prediabetes    A1C: 6.1   ISS and lyrica   TSH: 1.5    MSK:   Ambulating well. Continue with PT/OT.    Disposition:   Continue with inpatient care.

## 2025-07-17 NOTE — PROGRESS NOTE ADULT - SUBJECTIVE AND OBJECTIVE BOX
Patient discussed on morning rounds with Dr. Gates     OPERATION & DATE: 7/14 s/p OPCABG x1     SUBJECTIVE ASSESSMENT: Patient reports to be doing well today. No acute complaints.     VITAL SIGNS:  Vital Signs Last 24 Hrs  T(C): 36.4 (16 Jul 2025 05:01), Max: 36.4 (15 Jul 2025 14:13)  T(F): 97.5 (16 Jul 2025 05:01), Max: 97.6 (15 Jul 2025 14:13)  HR: 63 (16 Jul 2025 12:00) (61 - 101)  BP: 100/59 (16 Jul 2025 12:00) (95/66 - 135/69)  BP(mean): 75 (16 Jul 2025 12:00) (70 - 96)  RR: 18 (16 Jul 2025 12:00) (16 - 20)  SpO2: 95% (16 Jul 2025 12:00) (91% - 99%)    Parameters below as of 16 Jul 2025 12:00  Patient On (Oxygen Delivery Method): room air      I&O's Detail    15 Jul 2025 07:01  -  16 Jul 2025 07:00  --------------------------------------------------------  IN:    Albumin 5%  - 250 mL: 1000 mL    IV PiggyBack: 250 mL    Lactated Ringers: 1000 mL    sodium chloride 0.9%: 40 mL  Total IN: 2290 mL    OUT:    Bulb (mL): 500 mL    Indwelling Catheter - Urethral (mL): 255 mL    Voided (mL): 1200 mL  Total OUT: 1955 mL    Total NET: 335 mL      16 Jul 2025 07:01  -  16 Jul 2025 12:48  --------------------------------------------------------  IN:    Oral Fluid: 480 mL  Total IN: 480 mL    OUT:    Bulb (mL): 100 mL    Voided (mL): 425 mL  Total OUT: 525 mL    Total NET: -45 mL        CHEST TUBE: No  MARY KAY DRAIN: Yes, X3. >100 cc this morning. 500 cc output yesterday.   EPICARDIAL WIRES: no, removed today   STITCHES: yes    PHYSICAL EXAM:  General: Sitting in bed comfortably in NAD  Neuro: A&Ox3, no focal deficits   HEENT: NCAT, EOMI   Cardiac: Regular rate and rhythm, normal S1 and S2. No m/r/g   Pulm: Breathing comfortably on room air. No signs of respiratory distress.   Abdomen: Soft, non-distended, non-tender.  Extremities: Warm and well perfused, no peripheral edema, distal pulses 2+. No calf tenderness.   MSK: Full AROM   Wound: MSI is clean, cry, and well approximated. No acute complaints.     LABS:                        9.9    12.51 )-----------( 175      ( 16 Jul 2025 04:33 )             30.2     PT/INR - ( 16 Jul 2025 04:33 )   PT: 13.3 sec;   INR: 1.14          PTT - ( 16 Jul 2025 04:33 )  PTT:29.4 sec  07-16    140  |  103  |  25[H]  ----------------------------<  126[H]  3.8   |  26  |  1.21    Ca    9.0      16 Jul 2025 04:33  Phos  3.0     07-16  Mg     2.3     07-16    TPro  6.2  /  Alb  4.3  /  TBili  0.6  /  DBili  x   /  AST  34  /  ALT  11  /  AlkPhos  51  07-16    Urinalysis Basic - ( 16 Jul 2025 04:33 )    Color: x / Appearance: x / SG: x / pH: x  Gluc: 126 mg/dL / Ketone: x  / Bili: x / Urobili: x   Blood: x / Protein: x / Nitrite: x   Leuk Esterase: x / RBC: x / WBC x   Sq Epi: x / Non Sq Epi: x / Bacteria: x      MEDICATIONS  (STANDING):  acetaminophen     Tablet .. 1000 milliGRAM(s) Oral every 6 hours  ascorbic acid 500 milliGRAM(s) Oral two times a day  aspirin enteric coated 81 milliGRAM(s) Oral daily  atorvastatin 40 milliGRAM(s) Oral at bedtime  chlorhexidine 2% Cloths 1 Application(s) Topical daily  clopidogrel Tablet 75 milliGRAM(s) Oral daily  dextrose 5%. 1000 milliLiter(s) (50 mL/Hr) IV Continuous <Continuous>  dextrose 5%. 1000 milliLiter(s) (100 mL/Hr) IV Continuous <Continuous>  dextrose 50% Injectable 50 milliLiter(s) IV Push every 15 minutes  dextrose 50% Injectable 25 milliLiter(s) IV Push every 15 minutes  furosemide   Injectable 20 milliGRAM(s) IV Push once  gabapentin 100 milliGRAM(s) Oral every 8 hours  glucagon  Injectable 1 milliGRAM(s) IntraMuscular once  heparin   Injectable 7500 Unit(s) SubCutaneous every 8 hours  insulin lispro (ADMELOG) corrective regimen sliding scale   SubCutaneous Before meals and at bedtime  lactated ringers. 1000 milliLiter(s) (100 mL/Hr) IV Continuous <Continuous>  latanoprost 0.005% Ophthalmic Solution 1 Drop(s) Both EYES at bedtime  mupirocin 2% Ointment 1 Application(s) Both Nostrils two times a day  pantoprazole    Tablet 40 milliGRAM(s) Oral before breakfast  polyethylene glycol 3350 17 Gram(s) Oral daily  potassium chloride    Tablet ER 20 milliEquivalent(s) Oral once  pregabalin 50 milliGRAM(s) Oral daily  senna 2 Tablet(s) Oral at bedtime  sodium chloride 0.9% lock flush 3 milliLiter(s) IV Push every 8 hours    MEDICATIONS  (PRN):  aluminum hydroxide/magnesium hydroxide/simethicone Suspension 30 milliLiter(s) Oral every 4 hours PRN Dyspepsia  dextrose Oral Gel 15 Gram(s) Oral once PRN Blood Glucose LESS THAN 70 milliGRAM(s)/deciliter  oxyCODONE    IR 5 milliGRAM(s) Oral every 6 hours PRN Severe Pain (7 - 10)    RADIOLOGY & ADDITIONAL TESTS:      
CTICU  CRITICAL  CARE  attending     Hand off received 					   Pertinent clinical, laboratory, radiographic, hemodynamic, echocardiographic, respiratory data, microbiologic data and chart were reviewed and analyzed frequently throughout the course of the day and night      74-year-old male with RA, HTN, HLD, pre-DM, BPH, VIMAL, overactive bladder, hx of CAD s/p PCI to RCA 3 years ago.  The patient was evaluated for Chest Pain and dyspnea on exertion.  Chest pain has been progressively worsening since 2019.   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  Calcific plaque obscures the lumen left circumflex, OM1, OM2, D1, proximal LAD, mid and distal LAD, RCA precluding stenosis evaluation. Remaining segments demonstrate non obstructive coronary artery disease.  TTE: Normal EF. Heavy aortic valve calcifications. Moderate aortic stenosis. Ascending aorta 4cm.  TTE 7/8/2025: Normal left ventricular diastolic function, with normal left ventricular filling pressure.Normal right ventricular cavity size, with normal wall thickness, and normal right ventricular systolic function.  Left atrium is moderately dilated. Estimated pulmonary artery systolic pressure is 25 mmHg.  Aortic root at the sinuses of Valsalva is normal in size, measuring 3.50 cm (indexed 1.73 cm/m²).     Cardiac cath: Right dominant circulation. 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  Cath works FFR with 3D functional mapping of color-coded FFR of the LAD is negative 0.97  Cath works FFR with 3D functional mapping of color-coded FFR of the LCX is negative 0.91    The patient is referred by Heaven Brewer MD. **unable to stent coronary anomaly. CCS III. NYHA Class III. ?    Patient evaluated by CTS and is deemed a candidate for transposition of RCA. He presents for same day admission.     S/P SVG to RCA          FAMILY HISTORY:  Family history of CVA  father at 60 yo and mother at 85 yo    PAST MEDICAL & SURGICAL HISTORY:  Hyperlipidemia  Hypertension  Pre-diabetes  Glaucoma  BPH (benign prostatic hyperplasia)  Anomalous right coronary artery  H/O repair of right rotator cuff x 2  H/O decompression of ulnar nerve in 2022  H/O right inguinal hernia repair  S/P left rotator cuff repair  History of right ankle surgery (pin placed)        14 system review was unremarkable    Vital signs, hemodynamic and respiratory parameters were reviewed from the bedside nursing flow sheet.  ICU Vital Signs Last 24 Hrs  T(C): 35.9 (14 Jul 2025 21:16), Max: 36.5 (14 Jul 2025 10:48)  T(F): 96.6 (14 Jul 2025 21:16), Max: 96.6 (14 Jul 2025 21:16)  HR: 52 (14 Jul 2025 21:00) (52 - 65)  BP: 178/77 (14 Jul 2025 10:48) (178/77 - 178/77)  BP(mean): --  ABP: 121/65 (14 Jul 2025 21:00) (109/58 - 121/65)  ABP(mean): 86 (14 Jul 2025 21:00) (76 - 86)  RR: 12 (14 Jul 2025 21:00) (12 - 16)  SpO2: 100% (14 Jul 2025 21:00) (94% - 100%)    O2 Parameters below as of 14 Jul 2025 21:00  Patient On (Oxygen Delivery Method): ventilator    O2 Concentration (%): 50      Adult Advanced Hemodynamics Last 24 Hrs  CVP(mm Hg): 10 (14 Jul 2025 21:00) (10 - 10)  CVP(cm H2O): --  CO: --  CI: --  PA: --  PA(mean): --  PCWP: --  SVR: --  SVRI: --  PVR: --  PVRI: --, ABG - ( 14 Jul 2025 21:00 )  pH, Arterial: 7.38  pH, Blood: x     /  pCO2: 41    /  pO2: 136   / HCO3: 24    / Base Excess: -0.8  /  SaO2: 99.1                Intake and output was reviewed and the fluid balance was calculated  Daily Height in cm: 152.4 (14 Jul 2025 10:48)    Daily   I&O's Summary    14 Jul 2025 07:01  -  14 Jul 2025 21:34  --------------------------------------------------------  IN: 280.8 mL / OUT: 460 mL / NET: -179.2 mL        All lines and drain sites were assessed      Neuro: No change in the mental status from the baseline.   Neck: No JVD.  CVS: S1, S2, No S3.  Lungs: Good air entry bilaterally.  Abd: Soft. No tenderness. + Bowel sounds.  Vascular: + DP/PT.  Extremities: No edema.  Lymphatic: Normal.  Skin: No abnormalities.      labs  CBC Full  -  ( 14 Jul 2025 19:27 )  WBC Count : 10.27 K/uL  RBC Count : 4.03 M/uL  Hemoglobin : 11.9 g/dL  Hematocrit : 35.9 %  Platelet Count - Automated : 169 K/uL  Mean Cell Volume : 89.1 fl  Mean Cell Hemoglobin : 29.5 pg  Mean Cell Hemoglobin Concentration : 33.1 g/dL  Auto Neutrophil # : 8.11 K/uL  Auto Lymphocyte # : 1.23 K/uL  Auto Monocyte # : 0.45 K/uL  Auto Eosinophil # : 0.39 K/uL  Auto Basophil # : 0.03 K/uL  Auto Neutrophil % : 78.9 %  Auto Lymphocyte % : 12.0 %  Auto Monocyte % : 4.4 %  Auto Eosinophil % : 3.8 %  Auto Basophil % : 0.3 %    07-14    139  |  105  |  19  ----------------------------<  122[H]  4.2   |  25  |  0.95    Ca    9.0      14 Jul 2025 19:22  Phos  2.7     07-14  Mg     1.9     07-14    TPro  6.0  /  Alb  4.0  /  TBili  0.6  /  DBili  x   /  AST  19  /  ALT  11  /  AlkPhos  62  07-14    PT/INR - ( 14 Jul 2025 19:22 )   PT: 13.4 sec;   INR: 1.15          PTT - ( 14 Jul 2025 19:22 )  PTT:31.5 sec  The current medications were reviewed   MEDICATIONS  (STANDING):  acetaminophen   IVPB .. 1000 milliGRAM(s) IV Intermittent every 6 hours  ascorbic acid 500 milliGRAM(s) Oral two times a day  aspirin enteric coated 81 milliGRAM(s) Oral daily  atorvastatin 40 milliGRAM(s) Oral at bedtime  BUPivacaine liposome 1.3% Injectable (no eMAR) 20 milliLiter(s) Local Injection once  ceFAZolin   IVPB 2000 milliGRAM(s) IV Intermittent every 8 hours  chlorhexidine 0.12% Liquid 15 milliLiter(s) Oral Mucosa every 12 hours  chlorhexidine 2% Cloths 1 Application(s) Topical daily  clopidogrel Tablet 75 milliGRAM(s) Oral daily  dexMEDEtomidine Infusion 0.1 MICROgram(s)/kG/Hr (2.15 mL/Hr) IV Continuous <Continuous>  dextrose 50% Injectable 50 milliLiter(s) IV Push every 15 minutes  dextrose 50% Injectable 25 milliLiter(s) IV Push every 15 minutes  gabapentin 100 milliGRAM(s) Oral every 8 hours  heparin   Injectable 5000 Unit(s) SubCutaneous every 8 hours  insulin regular Infusion 1 Unit(s)/Hr (1 mL/Hr) IV Continuous <Continuous>  latanoprost 0.005% Ophthalmic Solution 1 Drop(s) Both EYES at bedtime  mupirocin 2% Ointment 1 Application(s) Both Nostrils two times a day  niCARdipine Infusion 5 mG/Hr (25 mL/Hr) IV Continuous <Continuous>  pantoprazole    Tablet 40 milliGRAM(s) Oral once  polyethylene glycol 3350 17 Gram(s) Oral daily  sodium chloride 0.9%. 1000 milliLiter(s) (10 mL/Hr) IV Continuous <Continuous>    MEDICATIONS  (PRN):          74 year old  Male admitted with aberrant RCA.  Arrived intubated on IV nicardipine.  S/P CABG (SVG to RCA).  Hemodynamically stable.  Good oxygenation.  Fair urine out put.  Mild hematuria (No drop in H/H)        My plan includes :  WEAN to Extubate.  D/C nicardipine  Statin and Betablocker.  Dual antiplatelet Rx.  Antiglaucoma meds.  Close hemodynamic monitoring   Monitor for arrhythmias and monitor parameters for organ perfusion  Monitor neurologic status  Monitor renal function.  Head of the bed should remain elevated to 45 deg .   Chest PT and IS will be encouraged  Monitor adequacy of oxygenation   Nutritional goals will be met using po eventually , ensure adequate caloric intake and monitor the same  Stress ulcer and VTE prophylaxis will be achieved    Glycemic control is satisfactory  Electrolytes have been repleted as necessary and wound care has been carried out. Pain control has been achieved.   Aggressive physical therapy and early mobility and ambulation goals will be met   The family was updated about the course and plan  CRITICAL CARE TIME SPENT in evaluation and management, reassessments, review and interpretation of labs and x-rays, hemodynamic management, formulating a plan and coordinating care: ___90____ MIN.  Time does not include procedural time.  CTICU ATTENDING     					    Song Lal MD                        	
CTICU  CRITICAL  CARE  attending     Hand off received 	    CHIEF COMPLAINT    s/p ohs    				   Pertinent clinical, laboratory, radiographic, hemodynamic, echocardiographic, respiratory data, microbiologic data and chart were reviewed and analyzed frequently throughout the course of the day and night  Patient seen and examined with CTS/ SH attending at bedside    INTERVAL HISTORY  uneventful     Pt is a 74y , Male, HEALTH ISSUES - PROBLEM Dx:      , FAMILY HISTORY:  Family history of CVA  father at 60 yo and mother at 83 yo    PAST MEDICAL & SURGICAL HISTORY:  Hyperlipidemia      Hypertension      Pre-diabetes      Glaucoma      BPH (benign prostatic hyperplasia)      Anomalous right coronary artery      H/O repair of right rotator cuff  x 2      H/O decompression of ulnar nerve  2022      H/O hernia repair  right inguinal      S/P rotator cuff repair  left      History of ankle surgery  right ( pin placed)        Patient is a 74y old  Male who presents with a chief complaint of aberrant RCA (15 Jul 2025 12:31)      14 system review limited by mentation and multiorgan morbidity     Vital signs, hemodynamic and respiratory parameters were reviewed from the bedside nursing flowsheet.  ICU Vital Signs Last 24 Hrs  T(C): 36.4 (15 Jul 2025 14:13), Max: 36.4 (15 Jul 2025 01:11)  T(F): 97.6 (15 Jul 2025 14:13), Max: 97.6 (15 Jul 2025 01:11)  HR: 101 (15 Jul 2025 21:05) (63 - 101)  BP: 135/69 (15 Jul 2025 21:05) (95/66 - 135/69)  BP(mean): 96 (15 Jul 2025 21:05) (72 - 96)  ABP: 100/48 (15 Jul 2025 11:00) (88/48 - 127/63)  ABP(mean): 68 (15 Jul 2025 11:00) (62 - 85)  RR: 16 (15 Jul 2025 21:05) (16 - 20)  SpO2: 93% (15 Jul 2025 21:05) (91% - 100%)    O2 Parameters below as of 15 Jul 2025 21:05  Patient On (Oxygen Delivery Method): room air          Adult Advanced Hemodynamics Last 24 Hrs  CVP(mm Hg): 18 (15 Jul 2025 16:00) (3 - 18)  CVP(cm H2O): --  CO: --  CI: --  PA: --  PA(mean): --  PCWP: --  SVR: --  SVRI: --  PVR: --  PVRI: --, ABG - ( 15 Jul 2025 11:05 )  pH, Arterial: 7.42  pH, Blood: x     /  pCO2: 38    /  pO2: 73    / HCO3: 25    / Base Excess: 0.3   /  SaO2: 95.4                Intake and output was reviewed and the fluid balance was calculated  Daily     Daily   I&O's Summary    14 Jul 2025 07:01  -  15 Jul 2025 07:00  --------------------------------------------------------  IN: 1080.7 mL / OUT: 1915 mL / NET: -834.3 mL    15 Jul 2025 07:01  -  16 Jul 2025 00:52  --------------------------------------------------------  IN: 1590 mL / OUT: 780 mL / NET: 810 mL        All lines and drain sites were assessed  [facility]      POCT Blood Glucose.: 125 mg/dL (15 Jul 2025 22:12)    No acute change in focality  Auscultation of the chest reveals equal bs  Abdomen is soft  Extremities are warm and well perfused  Wounds appear clean and unremarkable  Antibiotics are periop    labs  CBC Full  -  ( 15 Jul 2025 15:45 )  WBC Count : 12.33 K/uL  RBC Count : 3.28 M/uL  Hemoglobin : 9.6 g/dL  Hematocrit : 29.0 %  Platelet Count - Automated : 178 K/uL  Mean Cell Volume : 88.4 fl  Mean Cell Hemoglobin : 29.3 pg  Mean Cell Hemoglobin Concentration : 33.1 g/dL  Auto Neutrophil # : 9.68 K/uL  Auto Lymphocyte # : 1.02 K/uL  Auto Monocyte # : 1.56 K/uL  Auto Eosinophil # : 0.00 K/uL  Auto Basophil # : 0.01 K/uL  Auto Neutrophil % : 78.4 %  Auto Lymphocyte % : 8.3 %  Auto Monocyte % : 12.7 %  Auto Eosinophil % : 0.0 %  Auto Basophil % : 0.1 %    07-15    138  |  102  |  29[H]  ----------------------------<  127[H]  4.0   |  27  |  1.36[H]    Ca    8.9      15 Jul 2025 22:28  Phos  3.5     07-15  Mg     2.4     07-15    TPro  6.4  /  Alb  4.5  /  TBili  0.4  /  DBili  x   /  AST  29  /  ALT  11  /  AlkPhos  49  07-15    PT/INR - ( 15 Jul 2025 15:45 )   PT: 13.9 sec;   INR: 1.19          PTT - ( 15 Jul 2025 15:45 )  PTT:29.5 sec  The current medications were reviewed   MEDICATIONS  (STANDING):  acetaminophen     Tablet .. 1000 milliGRAM(s) Oral every 6 hours  ascorbic acid 500 milliGRAM(s) Oral two times a day  aspirin enteric coated 81 milliGRAM(s) Oral daily  atorvastatin 40 milliGRAM(s) Oral at bedtime  ceFAZolin   IVPB 2000 milliGRAM(s) IV Intermittent every 8 hours  chlorhexidine 2% Cloths 1 Application(s) Topical daily  clopidogrel Tablet 75 milliGRAM(s) Oral daily  dextrose 5%. 1000 milliLiter(s) (50 mL/Hr) IV Continuous <Continuous>  dextrose 5%. 1000 milliLiter(s) (100 mL/Hr) IV Continuous <Continuous>  dextrose 50% Injectable 50 milliLiter(s) IV Push every 15 minutes  dextrose 50% Injectable 25 milliLiter(s) IV Push every 15 minutes  gabapentin 100 milliGRAM(s) Oral every 8 hours  glucagon  Injectable 1 milliGRAM(s) IntraMuscular once  heparin   Injectable 7500 Unit(s) SubCutaneous every 8 hours  insulin lispro (ADMELOG) corrective regimen sliding scale   SubCutaneous Before meals and at bedtime  lactated ringers. 1000 milliLiter(s) (100 mL/Hr) IV Continuous <Continuous>  latanoprost 0.005% Ophthalmic Solution 1 Drop(s) Both EYES at bedtime  mupirocin 2% Ointment 1 Application(s) Both Nostrils two times a day  pantoprazole    Tablet 40 milliGRAM(s) Oral before breakfast  pantoprazole    Tablet 40 milliGRAM(s) Oral once  polyethylene glycol 3350 17 Gram(s) Oral daily  pregabalin 50 milliGRAM(s) Oral daily  senna 2 Tablet(s) Oral at bedtime  sodium chloride 0.9% lock flush 3 milliLiter(s) IV Push every 8 hours    MEDICATIONS  (PRN):  aluminum hydroxide/magnesium hydroxide/simethicone Suspension 30 milliLiter(s) Oral every 4 hours PRN Dyspepsia  dextrose Oral Gel 15 Gram(s) Oral once PRN Blood Glucose LESS THAN 70 milliGRAM(s)/deciliter  oxyCODONE    IR 5 milliGRAM(s) Oral every 6 hours PRN Severe Pain (7 - 10)       PROBLEM LIST/ ASSESSMENT:  HEALTH ISSUES - PROBLEM Dx:      ,   Patient is a 74y old  Male who presents with a chief complaint of aberrant RCA (15 Jul 2025 12:31)     s/p cardiac surgery                My plan includes :  close hemodynamic, ventilatory and drain monitoring and management per post op routine    Monitor for arrhythmias and monitor parameters for organ perfusion  beta blockade not administered due to hemodynamic instability and bradycardia  monitor neurologic status  Head of the bed should remain elevated to 45 deg .   chest PT and IS will be encouraged  monitor adequacy of oxygenation and ventilation and attempt to wean oxygen  antibiotic regimen will be tailored to the clinical, laboratory and microbiologic data  Nutritional goals will be met using po eventually , ensure adequate caloric intake and montior the same  Stress ulcer and VTE prophylaxis will be achieved    Glycemic control is satisfactory  Electrolytes have been repleted as necessary and wound care has been carried out. Pain control has been achieved.   agressive physical therapy and early mobility and ambulation goals will be met   The family was updated about the course and plan  CRITICAL CARE TIME personally provided by me  in evaluation and management, reassessments, review and interpretation of labs and x-rays, ventilator and hemodynamic management, formulating a plan and coordinating care: __110___ MIN.  Time does not include procedural time. Time spent was non routine post-operarive caRE and included multiple and repeated evaluations at the bedside  CTICU ATTENDING     					    Sinan Jean Baptiste MD                        	
Patient discussed on morning rounds with Dr. Gerber     OPERATION & DATE: 7/14 s/p OPCABG x1     SUBJECTIVE ASSESSMENT: Patient reports to be doing well today. No acute complaints.       VITAL SIGNS:  Vital Signs Last 24 Hrs  T(C): 36.2 (17 Jul 2025 06:26), Max: 36.6 (16 Jul 2025 21:09)  T(F): 97.2 (17 Jul 2025 06:26), Max: 97.8 (16 Jul 2025 21:09)  HR: 76 (17 Jul 2025 08:42) (61 - 82)  BP: 121/62 (17 Jul 2025 08:42) (96/66 - 146/71)  BP(mean): 86 (17 Jul 2025 08:42) (71 - 99)  RR: 18 (17 Jul 2025 08:42) (16 - 18)  SpO2: 94% (17 Jul 2025 08:42) (94% - 99%)    Parameters below as of 17 Jul 2025 08:42  Patient On (Oxygen Delivery Method): room air      I&O's Detail    16 Jul 2025 07:01  -  17 Jul 2025 07:00  --------------------------------------------------------  IN:    IV PiggyBack: 100 mL    Oral Fluid: 1320 mL  Total IN: 1420 mL    OUT:    Bulb (mL): 250 mL    Voided (mL): 2175 mL  Total OUT: 2425 mL    Total NET: -1005 mL      17 Jul 2025 07:01  -  17 Jul 2025 10:18  --------------------------------------------------------  IN:  Total IN: 0 mL    OUT:    Bulb (mL): 20 mL  Total OUT: 20 mL    Total NET: -20 mL        CHEST TUBE: No  MARY KAY DRAIN: Yes, X3. >250 cc output 24 hrs.   EPICARDIAL WIRES: no, removed today   STITCHES: yes    PHYSICAL EXAM:  General: Sitting in bed comfortably in NAD  Neuro: A&Ox3, no focal deficits   HEENT: NCAT, EOMI   Cardiac: Regular rate and rhythm, normal S1 and S2. No m/r/g   Pulm: Breathing comfortably on room air. No signs of respiratory distress.   Abdomen: Soft, non-distended, non-tender.  Extremities: Warm and well perfused, no peripheral edema, distal pulses 2+.    MSK: Full AROM   Wound: MSI is clean, cry, and well approximated. Drain in place.     LABS:                        9.2    9.57  )-----------( 170      ( 17 Jul 2025 05:30 )             28.7     PT/INR - ( 17 Jul 2025 05:30 )   PT: 12.5 sec;   INR: 1.07          PTT - ( 17 Jul 2025 05:30 )  PTT:30.9 sec  07-17    138  |  100  |  22  ----------------------------<  105[H]  4.3   |  26  |  1.17    Ca    8.9      17 Jul 2025 05:30  Phos  3.0     07-16  Mg     2.4     07-17    TPro  6.3  /  Alb  4.1  /  TBili  0.6  /  DBili  x   /  AST  26  /  ALT  7[L]  /  AlkPhos  49  07-17    Urinalysis Basic - ( 17 Jul 2025 05:30 )    Color: x / Appearance: x / SG: x / pH: x  Gluc: 105 mg/dL / Ketone: x  / Bili: x / Urobili: x   Blood: x / Protein: x / Nitrite: x   Leuk Esterase: x / RBC: x / WBC x   Sq Epi: x / Non Sq Epi: x / Bacteria: x      MEDICATIONS  (STANDING):  acetaminophen     Tablet .. 1000 milliGRAM(s) Oral every 6 hours  ascorbic acid 500 milliGRAM(s) Oral two times a day  aspirin enteric coated 81 milliGRAM(s) Oral daily  atorvastatin 40 milliGRAM(s) Oral at bedtime  chlorhexidine 2% Cloths 1 Application(s) Topical daily  clopidogrel Tablet 75 milliGRAM(s) Oral daily  dextrose 5%. 1000 milliLiter(s) (100 mL/Hr) IV Continuous <Continuous>  dextrose 5%. 1000 milliLiter(s) (50 mL/Hr) IV Continuous <Continuous>  dextrose 50% Injectable 50 milliLiter(s) IV Push every 15 minutes  dextrose 50% Injectable 25 milliLiter(s) IV Push every 15 minutes  gabapentin 100 milliGRAM(s) Oral every 8 hours  glucagon  Injectable 1 milliGRAM(s) IntraMuscular once  heparin   Injectable 7500 Unit(s) SubCutaneous every 8 hours  insulin lispro (ADMELOG) corrective regimen sliding scale   SubCutaneous Before meals and at bedtime  lactated ringers. 1000 milliLiter(s) (100 mL/Hr) IV Continuous <Continuous>  latanoprost 0.005% Ophthalmic Solution 1 Drop(s) Both EYES at bedtime  mupirocin 2% Ointment 1 Application(s) Both Nostrils two times a day  pantoprazole    Tablet 40 milliGRAM(s) Oral before breakfast  polyethylene glycol 3350 17 Gram(s) Oral daily  pregabalin 50 milliGRAM(s) Oral daily  senna 2 Tablet(s) Oral at bedtime  sodium chloride 0.9% lock flush 3 milliLiter(s) IV Push every 8 hours    MEDICATIONS  (PRN):  aluminum hydroxide/magnesium hydroxide/simethicone Suspension 30 milliLiter(s) Oral every 4 hours PRN Dyspepsia  dextrose Oral Gel 15 Gram(s) Oral once PRN Blood Glucose LESS THAN 70 milliGRAM(s)/deciliter  oxyCODONE    IR 5 milliGRAM(s) Oral every 6 hours PRN Severe Pain (7 - 10)    RADIOLOGY & ADDITIONAL TESTS:

## 2025-07-18 ENCOUNTER — TRANSCRIPTION ENCOUNTER (OUTPATIENT)
Age: 75
End: 2025-07-18

## 2025-07-18 VITALS — DIASTOLIC BLOOD PRESSURE: 68 MMHG | SYSTOLIC BLOOD PRESSURE: 111 MMHG | HEART RATE: 82 BPM | TEMPERATURE: 98 F

## 2025-07-18 LAB
ALBUMIN SERPL ELPH-MCNC: 4 G/DL — SIGNIFICANT CHANGE UP (ref 3.3–5)
ALP SERPL-CCNC: 53 U/L — SIGNIFICANT CHANGE UP (ref 40–120)
ALT FLD-CCNC: 8 U/L — LOW (ref 10–45)
ANION GAP SERPL CALC-SCNC: 11 MMOL/L — SIGNIFICANT CHANGE UP (ref 5–17)
APTT BLD: 36.3 SEC — SIGNIFICANT CHANGE UP (ref 26.1–36.8)
AST SERPL-CCNC: 22 U/L — SIGNIFICANT CHANGE UP (ref 10–40)
BILIRUB SERPL-MCNC: 0.5 MG/DL — SIGNIFICANT CHANGE UP (ref 0.2–1.2)
BUN SERPL-MCNC: 20 MG/DL — SIGNIFICANT CHANGE UP (ref 7–23)
CALCIUM SERPL-MCNC: 8.9 MG/DL — SIGNIFICANT CHANGE UP (ref 8.4–10.5)
CHLORIDE SERPL-SCNC: 102 MMOL/L — SIGNIFICANT CHANGE UP (ref 96–108)
CO2 SERPL-SCNC: 26 MMOL/L — SIGNIFICANT CHANGE UP (ref 22–31)
CREAT SERPL-MCNC: 1 MG/DL — SIGNIFICANT CHANGE UP (ref 0.5–1.3)
EGFR: 79 ML/MIN/1.73M2 — SIGNIFICANT CHANGE UP
EGFR: 79 ML/MIN/1.73M2 — SIGNIFICANT CHANGE UP
GLUCOSE SERPL-MCNC: 114 MG/DL — HIGH (ref 70–99)
HCT VFR BLD CALC: 28.8 % — LOW (ref 39–50)
HGB BLD-MCNC: 9.3 G/DL — LOW (ref 13–17)
INR BLD: 1.04 — SIGNIFICANT CHANGE UP (ref 0.85–1.16)
MAGNESIUM SERPL-MCNC: 2.7 MG/DL — HIGH (ref 1.6–2.6)
MCHC RBC-ENTMCNC: 29.2 PG — SIGNIFICANT CHANGE UP (ref 27–34)
MCHC RBC-ENTMCNC: 32.3 G/DL — SIGNIFICANT CHANGE UP (ref 32–36)
MCV RBC AUTO: 90.3 FL — SIGNIFICANT CHANGE UP (ref 80–100)
NRBC # BLD AUTO: 0 K/UL — SIGNIFICANT CHANGE UP (ref 0–0)
NRBC # FLD: 0 K/UL — SIGNIFICANT CHANGE UP (ref 0–0)
NRBC BLD AUTO-RTO: 0 /100 WBCS — SIGNIFICANT CHANGE UP (ref 0–0)
PLATELET # BLD AUTO: 207 K/UL — SIGNIFICANT CHANGE UP (ref 150–400)
PMV BLD: 10.6 FL — SIGNIFICANT CHANGE UP (ref 7–13)
POTASSIUM SERPL-MCNC: 4.4 MMOL/L — SIGNIFICANT CHANGE UP (ref 3.5–5.3)
POTASSIUM SERPL-SCNC: 4.4 MMOL/L — SIGNIFICANT CHANGE UP (ref 3.5–5.3)
PROT SERPL-MCNC: 6.7 G/DL — SIGNIFICANT CHANGE UP (ref 6–8.3)
PROTHROM AB SERPL-ACNC: 12 SEC — SIGNIFICANT CHANGE UP (ref 9.9–13.4)
RBC # BLD: 3.19 M/UL — LOW (ref 4.2–5.8)
RBC # FLD: 13.1 % — SIGNIFICANT CHANGE UP (ref 10.3–14.5)
SODIUM SERPL-SCNC: 139 MMOL/L — SIGNIFICANT CHANGE UP (ref 135–145)
WBC # BLD: 9.86 K/UL — SIGNIFICANT CHANGE UP (ref 3.8–10.5)
WBC # FLD AUTO: 9.86 K/UL — SIGNIFICANT CHANGE UP (ref 3.8–10.5)

## 2025-07-18 PROCEDURE — 85014 HEMATOCRIT: CPT

## 2025-07-18 PROCEDURE — 85025 COMPLETE CBC W/AUTO DIFF WBC: CPT

## 2025-07-18 PROCEDURE — 86923 COMPATIBILITY TEST ELECTRIC: CPT

## 2025-07-18 PROCEDURE — 85027 COMPLETE CBC AUTOMATED: CPT

## 2025-07-18 PROCEDURE — 71045 X-RAY EXAM CHEST 1 VIEW: CPT

## 2025-07-18 PROCEDURE — 86901 BLOOD TYPING SEROLOGIC RH(D): CPT

## 2025-07-18 PROCEDURE — 86900 BLOOD TYPING SEROLOGIC ABO: CPT

## 2025-07-18 PROCEDURE — 94002 VENT MGMT INPAT INIT DAY: CPT

## 2025-07-18 PROCEDURE — 82803 BLOOD GASES ANY COMBINATION: CPT

## 2025-07-18 PROCEDURE — 80048 BASIC METABOLIC PNL TOTAL CA: CPT

## 2025-07-18 PROCEDURE — 82947 ASSAY GLUCOSE BLOOD QUANT: CPT

## 2025-07-18 PROCEDURE — C1889: CPT

## 2025-07-18 PROCEDURE — 94640 AIRWAY INHALATION TREATMENT: CPT

## 2025-07-18 PROCEDURE — 80053 COMPREHEN METABOLIC PANEL: CPT

## 2025-07-18 PROCEDURE — 97163 PT EVAL HIGH COMPLEX 45 MIN: CPT

## 2025-07-18 PROCEDURE — 86850 RBC ANTIBODY SCREEN: CPT

## 2025-07-18 PROCEDURE — 36415 COLL VENOUS BLD VENIPUNCTURE: CPT

## 2025-07-18 PROCEDURE — 83605 ASSAY OF LACTIC ACID: CPT

## 2025-07-18 PROCEDURE — 85730 THROMBOPLASTIN TIME PARTIAL: CPT

## 2025-07-18 PROCEDURE — 85610 PROTHROMBIN TIME: CPT

## 2025-07-18 PROCEDURE — P9045: CPT

## 2025-07-18 PROCEDURE — 84100 ASSAY OF PHOSPHORUS: CPT

## 2025-07-18 PROCEDURE — 82962 GLUCOSE BLOOD TEST: CPT

## 2025-07-18 PROCEDURE — 97116 GAIT TRAINING THERAPY: CPT

## 2025-07-18 PROCEDURE — 82330 ASSAY OF CALCIUM: CPT

## 2025-07-18 PROCEDURE — 86891 AUTOLOGOUS BLOOD OP SALVAGE: CPT

## 2025-07-18 PROCEDURE — 83735 ASSAY OF MAGNESIUM: CPT

## 2025-07-18 PROCEDURE — 84132 ASSAY OF SERUM POTASSIUM: CPT

## 2025-07-18 PROCEDURE — 84295 ASSAY OF SERUM SODIUM: CPT

## 2025-07-18 PROCEDURE — 71045 X-RAY EXAM CHEST 1 VIEW: CPT | Mod: 26

## 2025-07-18 RX ORDER — FUROSEMIDE 10 MG/ML
1 INJECTION INTRAMUSCULAR; INTRAVENOUS
Qty: 30 | Refills: 0
Start: 2025-07-18 | End: 2025-08-16

## 2025-07-18 RX ORDER — LIDOCAINE HCL/EPINEPHRINE/PF 1 %-1:200K
10 AMPUL (ML) INJECTION ONCE
Refills: 0 | Status: COMPLETED | OUTPATIENT
Start: 2025-07-18 | End: 2025-07-18

## 2025-07-18 RX ORDER — AMOXICILLIN AND CLAVULANATE POTASSIUM 500; 125 MG/1; MG/1
1 TABLET, FILM COATED ORAL
Refills: 0 | DISCHARGE

## 2025-07-18 RX ORDER — MELOXICAM 15 MG/1
1 TABLET ORAL
Refills: 0 | DISCHARGE

## 2025-07-18 RX ORDER — OXYCODONE HYDROCHLORIDE 30 MG/1
1 TABLET ORAL
Qty: 28 | Refills: 0
Start: 2025-07-18 | End: 2025-07-24

## 2025-07-18 RX ORDER — METOPROLOL SUCCINATE 50 MG/1
0.5 TABLET, EXTENDED RELEASE ORAL
Qty: 30 | Refills: 0
Start: 2025-07-18 | End: 2025-08-16

## 2025-07-18 RX ORDER — PREGABALIN 50 MG/1
1 CAPSULE ORAL
Qty: 30 | Refills: 0
Start: 2025-07-18 | End: 2025-08-16

## 2025-07-18 RX ORDER — SENNA 187 MG
2 TABLET ORAL
Qty: 14 | Refills: 0
Start: 2025-07-18 | End: 2025-07-24

## 2025-07-18 RX ORDER — ACETAMINOPHEN 500 MG/5ML
2 LIQUID (ML) ORAL
Qty: 56 | Refills: 0
Start: 2025-07-18 | End: 2025-07-24

## 2025-07-18 RX ORDER — AMLODIPINE BESYLATE 10 MG/1
2.5 TABLET ORAL DAILY
Refills: 0 | Status: DISCONTINUED | OUTPATIENT
Start: 2025-07-18 | End: 2025-07-18

## 2025-07-18 RX ORDER — CLOPIDOGREL BISULFATE 75 MG/1
1 TABLET, FILM COATED ORAL
Qty: 30 | Refills: 0
Start: 2025-07-18 | End: 2025-08-16

## 2025-07-18 RX ORDER — ASPIRIN 325 MG
1 TABLET ORAL
Qty: 30 | Refills: 0
Start: 2025-07-18 | End: 2025-08-16

## 2025-07-18 RX ORDER — LATANOPROST PF 0.05 MG/ML
1 SOLUTION/ DROPS OPHTHALMIC
Qty: 0 | Refills: 0 | DISCHARGE
Start: 2025-07-18

## 2025-07-18 RX ORDER — METOPROLOL SUCCINATE 50 MG/1
12.5 TABLET, EXTENDED RELEASE ORAL
Refills: 0 | Status: DISCONTINUED | OUTPATIENT
Start: 2025-07-18 | End: 2025-07-18

## 2025-07-18 RX ORDER — ATORVASTATIN CALCIUM 80 MG/1
1 TABLET, FILM COATED ORAL
Qty: 30 | Refills: 0
Start: 2025-07-18 | End: 2025-08-16

## 2025-07-18 RX ADMIN — HEPARIN SODIUM 7500 UNIT(S): 1000 INJECTION INTRAVENOUS; SUBCUTANEOUS at 14:24

## 2025-07-18 RX ADMIN — GABAPENTIN 100 MILLIGRAM(S): 400 CAPSULE ORAL at 05:32

## 2025-07-18 RX ADMIN — Medication 81 MILLIGRAM(S): at 11:47

## 2025-07-18 RX ADMIN — GABAPENTIN 100 MILLIGRAM(S): 400 CAPSULE ORAL at 14:24

## 2025-07-18 RX ADMIN — HEPARIN SODIUM 7500 UNIT(S): 1000 INJECTION INTRAVENOUS; SUBCUTANEOUS at 05:31

## 2025-07-18 RX ADMIN — AMLODIPINE BESYLATE 2.5 MILLIGRAM(S): 10 TABLET ORAL at 05:32

## 2025-07-18 RX ADMIN — Medication 40 MILLIGRAM(S): at 05:32

## 2025-07-18 RX ADMIN — Medication 500 MILLIGRAM(S): at 05:32

## 2025-07-18 RX ADMIN — Medication 1000 MILLIGRAM(S): at 14:30

## 2025-07-18 RX ADMIN — Medication 10 MILLILITER(S): at 15:14

## 2025-07-18 RX ADMIN — CLOPIDOGREL BISULFATE 75 MILLIGRAM(S): 75 TABLET, FILM COATED ORAL at 11:48

## 2025-07-18 RX ADMIN — PREGABALIN 50 MILLIGRAM(S): 50 CAPSULE ORAL at 11:47

## 2025-07-18 RX ADMIN — Medication 3 MILLILITER(S): at 12:05

## 2025-07-18 RX ADMIN — METOPROLOL SUCCINATE 12.5 MILLIGRAM(S): 50 TABLET, EXTENDED RELEASE ORAL at 11:50

## 2025-07-18 RX ADMIN — Medication 1000 MILLIGRAM(S): at 02:57

## 2025-07-18 RX ADMIN — Medication 3 MILLILITER(S): at 06:14

## 2025-07-18 RX ADMIN — Medication 1000 MILLIGRAM(S): at 02:23

## 2025-07-18 RX ADMIN — MUPIROCIN CALCIUM 1 APPLICATION(S): 20 CREAM TOPICAL at 05:32

## 2025-07-18 NOTE — DISCHARGE NOTE PROVIDER - NSDCMRMEDTOKEN_GEN_ALL_CORE_FT
alfuzosin 10 mg oral tablet, extended release: 1 tab(s) orally once a day  aspirin 81 mg oral delayed release tablet: 1 tab(s) orally once a day  atorvastatin 40 mg oral tablet: 1 tab(s) orally once a day (at bedtime)  Augmentin 875 mg-125 mg oral tablet: 1 tab(s) orally 2 times a day  brimonidine 0.2% ophthalmic solution: 1 drop(s) in each affected eye once a day  latanoprost 0.005% ophthalmic solution: 1 drop(s) to each affected eye once a day (in the evening)  lisinopril 40 mg oral tablet: 1 tab(s) orally once a day  meloxicam 15 mg oral tablet: 1 tab(s) orally once a day  mirabegron 50 mg oral tablet, extended release: 1 tab(s) orally once a day  pregabalin 50 mg oral capsule: 1 cap(s) orally 2 times a day  Vitamin C  mg oral capsule: 2 cap(s) orally once a day   acetaminophen 500 mg oral tablet: 2 tab(s) orally every 6 hours  aspirin 81 mg oral delayed release tablet: 1 tab(s) orally once a day  atorvastatin 40 mg oral tablet: 1 tab(s) orally once a day (at bedtime)  brimonidine 0.2% ophthalmic solution: 1 drop(s) in each affected eye once a day  clopidogrel 75 mg oral tablet: 1 tab(s) orally once a day  Lasix 20 mg oral tablet: 1 tab(s) orally once a day  latanoprost 0.005% ophthalmic solution: 1 drop(s) to each affected eye once a day (at bedtime)  metoprolol tartrate 25 mg oral tablet: 0.5 tab(s) orally 2 times a day  mirabegron 50 mg oral tablet, extended release: 1 tab(s) orally once a day  oxyCODONE 5 mg oral tablet: 1 tab(s) orally every 6 hours as needed for Severe Pain (7 - 10) MDD: 4  pantoprazole 40 mg oral delayed release tablet: 1 tab(s) orally once a day (before a meal)  Potassium Chloride (Eqv-Klor-Con 10) 10 mEq oral tablet, extended release: 1 tab(s) orally once a day  pregabalin 50 mg oral capsule: 1 cap(s) orally once a day MDD: 1  senna leaf extract oral tablet: 2 tab(s) orally once a day (at bedtime) as needed for  constipation

## 2025-07-18 NOTE — DISCHARGE NOTE PROVIDER - HOSPITAL COURSE
Patient discussed on morning rounds with Dr. Gerber   Operation Date: 7/14: OPCAB x 1 SVG-PDA ef nl  Primary Surgeon/Attending MD: Zabrina   Referring Physician: Osman   _ _ _ _ _ _ _ _ _ _ _ _   HOSPITAL COURSE:   73 yo male with PMHx of CAD (s/p PCI to RCA 2022), RA, HTN, HLD, pre-DM, BPH, VIMAL, overactive bladder who was referred to Dr. Gerber by Dr. Heaven Brewer for treatment of anomalous RCA, moderate AS and RCA CAD. Patient presented to [facility] on 7/14/25 and underwent a CAMB x1 (SVG to PDA) with Dr. Perez. Arrived to CTICU intubated, but extubated in the short course. 7/15 mtz removed, passed TOV. Iman removed. Transferred to floor. POD 2 overnight, patient pulled at wires and drains. PW removed. Patient went into flow Afib, converted out with 1x amio bolus. Holding BB iso baseline 1st degree heart block.  _ _ _ _ _ _ _ _ _ _ _ _   ***If patient had CVA this admission, call neuro to complete discharge NIHSS     DISCHARGE PHYSICAL EXAM:     _ _ _ _ _ _ _ _ _ _ _ _   REMOVAL CHECKLIST:         [ ] Epicardial wires         [ ] Stitches/tie downs,   If no, why?          [ ] PICC/Midline,   If no, why?    _ _ _ _ _ _ _ _ _ _ _ _   MEDICATION DISCHARGE CHECKLIST     CABG         [ ] Aspirin, [  ] Contraindicated, Reason:         [ ] Plavix, [  ] Contraindicated, Reason:         [ ] Statin, [  ] Contraindicated, Reason:         [ ] Lasix , [  ] Contraindicated, Reason:              Duration:          [ ] Beta-Blocker, [  ] Contraindicated, Reason:         Cardiac Rehab contraindicated due to recent cardiac surgery.         Anticoagulation         [ ] NOAC – Name, [ ] Reason:               Cost/Insurance barriers addressed: YES/NO          [ ] Coumadin, Indication:                INR Goal:               Follow up:   _ _ _ _ _ _ _ _ _ _ _   RELEVANT LABS/IMAGING:   _  _ _ _ _ _ _ _ _ _ _   CLINICAL FOLLOW UP NEEDS:      [ ] Lab work needed:      [ ] Imaging needed:      [ ] Home equipment            Type: (i.e. wound vac, pneumostat, prevena, wet/dry dressings, picc/midlines, MCOT, mtz etc)   _ _ _ _ _ _ _ _ _ _ _ _   Over 35 minutes was spent with the patient reviewing the discharge material including medications, follow up appointments, recovery, concerning symptoms, and how to contact their health care providers if they have questions.   Patient discussed on morning rounds with Dr. Gerber   Operation Date: 7/14: OPCAB x 1 SVG-PDA ef nl  Primary Surgeon/Attending MD: Zabrina   Referring Physician: Osman   _ _ _ _ _ _ _ _ _ _ _ _   HOSPITAL COURSE:   73 yo male with PMHx of CAD (s/p PCI to RCA 2022), RA, HTN, HLD, pre-DM, BPH, VIMAL, overactive bladder who was referred to Dr. Gerber by Dr. Heaven Brewer for treatment of anomalous RCA, moderate AS and RCA CAD. Patient presented to [facility] on 7/14/25 and underwent a CAMB x1 (SVG to PDA) with Dr. Perez. Arrived to CTICU intubated, but extubated in the short course. 7/15 mtz removed, passed TOV. Iman removed. Transferred to floor. POD 2 overnight, patient pulled at wires and drains. PW removed. Patient went into flow Afib, converted out with 1x amio bolus. Holding BB iso baseline 1st degree heart block.  _ _ _ _ _ _ _ _ _ _ _ _       DISCHARGE PHYSICAL EXAM:   GEN: NAD, looks comfortable  Neuro: A&Ox3.  No focal deficits.  Moving all extremities.   CV: S1S2, regular, no murmurs appreciated.  No carotid bruits.  No JVD  Lungs: Clear B/L.  No wheezing, rales or rhonchi  ABD: Soft, non-tender, non-distended.  +Bowel sounds  EXT: Warm and well perfused.  No peripheral edema noted. All Distal pulses palpable bilaterally.   Musculoskeletal: Moving all extremities with normal ROM, no joint swelling  Incisions: MSI clean. All drain removed no tie downs. Left EVH site c/d/i.   _ _ _   _ _ _ _ _ _ _ _ _   REMOVAL CHECKLIST:         [x ] Epicardial wires         [ x] Stitches/tie downs,   If no, why?          [ n/a ] PICC/Midline,   If no, why?    _ _ _ _ _ _ _ _ _ _ _ _   MEDICATION DISCHARGE CHECKLIST     CABG         [ x] Aspirin, [  ] Contraindicated, Reason:         [ ] Plavix, [  x] Contraindicated, Reason:         [ x] Statin, [  ] Contraindicated, Reason:         [ x] Lasix , [  ] Contraindicated, Reason:              Duration:          [ ] Beta-Blocker, [  ] Contraindicated, Reason:         Cardiac Rehab contraindicated due to recent cardiac surgery.         Anticoagulation         [ n/a ] NOAC – Name, [ ] Reason:               Cost/Insurance barriers addressed: YES/NO         _ _ _ _ _ _ _ _ _ _ _   RELEVANT LABS/IMAGING:   < from: Xray Chest 1 View- PORTABLE-Routine (Xray Chest 1 View- PORTABLE-Routine in AM.) (07.18.25 @ 06:14) >    Findings/  impression: Left basilar focal atelectasis. Redemonstration cardiomegaly,   thoracic aortic calcification, status post median sternotomy.. Stable   bony structures.    --- End of Report ---    < end of copied text >      _  _ _ _ _ _ _ _ _ _ _   CLINICAL FOLLOW UP NEEDS:      [ ] Lab work needed:      [ ] Imaging needed:      [ ] Home equipment            Type: (i.e. wound vac, pneumostat, prevena, wet/dry dressings, picc/midlines, MCOT, mtz etc)   _ _ _ _ _ _ _ _ _ _ _ _   Over 35 minutes was spent with the patient reviewing the discharge material including medications, follow up appointments, recovery, concerning symptoms, and how to contact their health care providers if they have questions.   Patient discussed on morning rounds with Dr. Gerber   Operation Date: 7/14: OPCAB x 1 SVG-PDA ef nl  Primary Surgeon/Attending MD: Zabrina   Referring Physician: Osman   _ _ _ _ _ _ _ _ _ _ _ _   HOSPITAL COURSE:   75 yo male with PMHx of CAD (s/p PCI to RCA 2022), RA, HTN, HLD, pre-DM, BPH, VIMAL, overactive bladder who was referred to Dr. Gerber by Dr. Heaven Brewer for treatment of anomalous RCA, moderate AS and RCA CAD. Patient presented to [facility] on 7/14/25 and underwent a CAMB x1 (SVG to PDA) with Dr. Perez. Arrived to CTICU intubated, but extubated in the short course. 7/15 mtz removed, passed TOV. Iman removed. Transferred to floor. POD 2 overnight, patient pulled at wires and drains. PW removed. Patient went into flow Afib, converted out with 1x amio bolus. Holding BB iso baseline 1st degree heart block. POD#3 Patient's EKG remained with a stable MA interval after initiating BB. Patient will be discharged home on low dose BB for afib ppx. Patient is ambulating on RA, pain is well controlled and he is passing flatus. As per Dr. Gerber patient is ready for discharge home.   _ _ _ _ _ _ _ _ _ _ _ _       DISCHARGE PHYSICAL EXAM:   GEN: NAD, looks comfortable  Neuro: A&Ox3.  No focal deficits.  Moving all extremities.   CV: S1S2, regular, no murmurs appreciated.  No carotid bruits.  No JVD  Lungs: Clear B/L.  No wheezing, rales or rhonchi  ABD: Soft, non-tender, non-distended.  +Bowel sounds  EXT: Warm and well perfused.  No peripheral edema noted. All Distal pulses palpable bilaterally.   Musculoskeletal: Moving all extremities with normal ROM, no joint swelling  Incisions: MSI clean. All drain removed no tie downs. Left EVH site c/d/i.   _ _ _   _ _ _ _ _ _ _ _ _   REMOVAL CHECKLIST:         [x ] Epicardial wires         [ x] Stitches/tie downs,   If no, why?          [ n/a ] PICC/Midline,   If no, why?    _ _ _ _ _ _ _ _ _ _ _ _   MEDICATION DISCHARGE CHECKLIST     CABG         [ x] Aspirin, [  ] Contraindicated, Reason:         [ x] Plavix, [  x] Contraindicated, Reason:         [ x] Statin, [  ] Contraindicated, Reason:         [ x] Lasix , [  ] Contraindicated, Reason:              Duration:          [x ] Beta-Blocker, [  ] Contraindicated, Reason:         Cardiac Rehab contraindicated due to recent cardiac surgery.         Anticoagulation         [ n/a ] NOAC – Name, [ ] Reason:          _ _ _ _ _ _ _ _ _ _ _   RELEVANT LABS/IMAGING:   < from: Xray Chest 1 View- PORTABLE-Routine (Xray Chest 1 View- PORTABLE-Routine in AM.) (07.18.25 @ 06:14) >    Findings/  impression: Left basilar focal atelectasis. Redemonstration cardiomegaly,   thoracic aortic calcification, status post median sternotomy.. Stable   bony structures.    --- End of Report ---    < end of copied text >      _  _ _ _ _ _ _ _ _ _ _   CLINICAL FOLLOW UP NEEDS:      [ ] Lab work needed:      [ ] Imaging needed:      [ ] Home equipment            Type: (i.e. wound vac, pneumostat, prevena, wet/dry dressings, picc/midlines, MCOT, mtz etc)   _ _ _ _ _ _ _ _ _ _ _ _   Over 35 minutes was spent with the patient reviewing the discharge material including medications, follow up appointments, recovery, concerning symptoms, and how to contact their health care providers if they have questions.   Patient discussed on morning rounds with Dr. Gerber   Operation Date: 7/14: OPCAB x 1 SVG-PDA ef nl  Primary Surgeon/Attending MD: Zabrina   Referring Physician: Osman   _ _ _ _ _ _ _ _ _ _ _ _   HOSPITAL COURSE:   75 yo male with PMHx of CAD (s/p PCI to RCA 2022), RA, HTN, HLD, pre-DM, BPH, VIMAL, overactive bladder who was referred to Dr. Gerber by Dr. Heaven Brewer for treatment of anomalous RCA, moderate AS and RCA CAD. Patient presented to [facility] on 7/14/25 and underwent a CAMB x1 (SVG to PDA) with Dr. Perez. Arrived to CTICU intubated, but extubated in the short course. 7/15 mtz removed, passed TOV. Iman removed. Transferred to floor. POD 2 overnight, patient pulled at wires and drains. PW removed. Patient went into flow Afib, converted out with 1x amio bolus. Holding BB iso baseline 1st degree heart block. POD#3 Patient's EKG remained with a stable [place] interval after initiating BB. Patient will be discharged home on low dose BB for afib ppx. Patient is ambulating on RA, pain is well controlled and he is passing flatus. As per Dr. Gerber patient is ready for discharge home.  x  _ _ _ _ _ _ _ _ _ _ _ _       DISCHARGE PHYSICAL EXAM:   GEN: NAD, looks comfortable  Neuro: A&Ox3.  No focal deficits.  Moving all extremities.   CV: S1S2, regular, no murmurs appreciated.  No carotid bruits.  No JVD  Lungs: Clear B/L.  No wheezing, rales or rhonchi  ABD: Soft, non-tender, non-distended.  +Bowel sounds  EXT: Warm and well perfused.  No peripheral edema noted. All Distal pulses palpable bilaterally.   Musculoskeletal: Moving all extremities with normal ROM, no joint swelling  Incisions: MSI clean. All drain removed no tie downs. Left EVH site c/d/i.   _ _ _   _ _ _ _ _ _ _ _ _   REMOVAL CHECKLIST:         [x ] Epicardial wires         [ x] Stitches/tie downs,   If no, why?          [ n/a ] PICC/Midline,   If no, why?    _ _ _ _ _ _ _ _ _ _ _ _   MEDICATION DISCHARGE CHECKLIST     CABG         [ x] Aspirin, [  ] Contraindicated, Reason:         [ x] Plavix, [  x] Contraindicated, Reason:         [ x] Statin, [  ] Contraindicated, Reason:         [ x] Lasix , [  ] Contraindicated, Reason:              Duration:          [x ] Beta-Blocker, [  ] Contraindicated, Reason:         Cardiac Rehab contraindicated due to recent cardiac surgery.         Anticoagulation         [ n/a ] NOAC – Name, [ ] Reason:          _ _ _ _ _ _ _ _ _ _ _   RELEVANT LABS/IMAGING:   < from: Xray Chest 1 View- PORTABLE-Routine (Xray Chest 1 View- PORTABLE-Routine in AM.) (07.18.25 @ 06:14) >    Findings/  impression: Left basilar focal atelectasis. Redemonstration cardiomegaly,   thoracic aortic calcification, status post median sternotomy.. Stable   bony structures.    --- End of Report ---    < end of copied text >      _  _ _ _ _ _ _ _ _ _ _   CLINICAL FOLLOW UP NEEDS:      [ ] Lab work needed:      [ ] Imaging needed:      [ ] Home equipment            Type: (i.e. wound vac, pneumostat, prevena, wet/dry dressings, picc/midlines, MCOT, mtz etc)   _ _ _ _ _ _ _ _ _ _ _ _   Over 35 minutes was spent with the patient reviewing the discharge material including medications, follow up appointments, recovery, concerning symptoms, and how to contact their health care providers if they have questions.

## 2025-07-18 NOTE — DISCHARGE NOTE NURSING/CASE MANAGEMENT/SOCIAL WORK - NSDCPEFALRISK_GEN_ALL_CORE
For information on Fall & Injury Prevention, visit: [email or web address] OR  [email or web address] OR  https://www.cdc.gov/steadi/patient.html

## 2025-07-18 NOTE — DISCHARGE NOTE PROVIDER - PROVIDER TOKENS
PROVIDER:[TOKEN:[[id number]],FOLLOWUP:[1 week]],PROVIDER:[TOKEN:[[id number]],FOLLOWUP:[2 weeks]] PROVIDER:[TOKEN:[[id number]],SCHEDULEDAPPT:[07/30/2025],SCHEDULEDAPPTTIME:[10:15 AM]],PROVIDER:[TOKEN:[[id number]],FOLLOWUP:[2 weeks]]

## 2025-07-18 NOTE — DISCHARGE NOTE NURSING/CASE MANAGEMENT/SOCIAL WORK - PATIENT PORTAL LINK FT
You can access the FollowMyHealth Patient Portal offered by [facility] by registering at the following website: [email or web address]. By joining [employer]’s FollowMyHealth portal, you will also be able to view your health information using other applications (apps) compatible with our system.

## 2025-07-18 NOTE — DISCHARGE NOTE PROVIDER - CARE PROVIDER_API CALL
Easton Gerber)  Thoracic and Cardiac Surgery  [address]  [place] [address]  Phone: [phone]  Fax: [phone]  Follow Up Time: 1 week    Heaven Brewer J  Cardiovascular Disease  [address] [id number]  Phone: [phone]  Fax: [phone]  Follow Up Time: 2 weeks   Easton Gerber)  Thoracic and Cardiac Surgery  [address]  [place] [address]  Phone: [phone]  Fax: [phone]  Scheduled Appointment: 07/30/2025 10:15 AM    Heaven Brewer  Cardiovascular Disease  [address] [place] [address]  Phone: [phone]  Fax: [phone]  Follow Up Time: 2 weeks

## 2025-07-18 NOTE — DISCHARGE NOTE PROVIDER - NSDCCPTREATMENT_GEN_ALL_CORE_FT
PRINCIPAL PROCEDURE  Procedure: CABG, with YOSELIN  Findings and Treatment: CABG x1 SVG-pda EF nml

## 2025-07-18 NOTE — DISCHARGE NOTE NURSING/CASE MANAGEMENT/SOCIAL WORK - FINANCIAL ASSISTANCE
[facility] provides services at a reduced cost to those who are determined to be eligible through [facility]’s financial assistance program. Information regarding [facility]’s financial assistance program can be found by going to [email or web address] or by calling [phone].

## 2025-07-18 NOTE — DISCHARGE NOTE PROVIDER - CARE PROVIDERS DIRECT ADDRESSES
[email or web address]

## 2025-07-19 ENCOUNTER — TRANSCRIPTION ENCOUNTER (OUTPATIENT)
Age: 75
End: 2025-07-19

## 2025-07-22 ENCOUNTER — TRANSCRIPTION ENCOUNTER (OUTPATIENT)
Age: 75
End: 2025-07-22

## 2025-07-22 ENCOUNTER — NON-APPOINTMENT (OUTPATIENT)
Age: 75
End: 2025-07-22

## 2025-07-22 RX ORDER — OXYCODONE 5 MG/1
5 TABLET ORAL EVERY 8 HOURS
Qty: 15 | Refills: 0 | Status: ACTIVE | COMMUNITY

## 2025-07-22 RX ORDER — METOPROLOL TARTRATE 25 MG/1
25 TABLET ORAL
Qty: 30 | Refills: 0 | Status: ACTIVE | COMMUNITY

## 2025-07-23 ENCOUNTER — APPOINTMENT (OUTPATIENT)
Dept: CARE COORDINATION | Facility: HOME HEALTH | Age: 75
End: 2025-07-23
Payer: MEDICARE

## 2025-07-23 DIAGNOSIS — R73.03 PREDIABETES: ICD-10-CM

## 2025-07-23 DIAGNOSIS — E78.5 HYPERLIPIDEMIA, UNSPECIFIED: ICD-10-CM

## 2025-07-23 DIAGNOSIS — N40.0 BENIGN PROSTATIC HYPERPLASIA WITHOUT LOWER URINARY TRACT SYMPTOMS: ICD-10-CM

## 2025-07-23 DIAGNOSIS — I44.0 ATRIOVENTRICULAR BLOCK, FIRST DEGREE: ICD-10-CM

## 2025-07-23 DIAGNOSIS — I10 ESSENTIAL (PRIMARY) HYPERTENSION: ICD-10-CM

## 2025-07-23 DIAGNOSIS — Z87.891 PERSONAL HISTORY OF NICOTINE DEPENDENCE: ICD-10-CM

## 2025-07-23 DIAGNOSIS — Z79.82 LONG TERM (CURRENT) USE OF ASPIRIN: ICD-10-CM

## 2025-07-23 DIAGNOSIS — I48.91 UNSPECIFIED ATRIAL FIBRILLATION: ICD-10-CM

## 2025-07-23 DIAGNOSIS — N32.81 OVERACTIVE BLADDER: ICD-10-CM

## 2025-07-23 PROCEDURE — 99024 POSTOP FOLLOW-UP VISIT: CPT

## 2025-07-23 RX ORDER — POLYETHYLENE GLYCOL 3350 17 G/17G
17 POWDER, FOR SOLUTION ORAL DAILY
Qty: 1 | Refills: 0 | Status: ACTIVE | COMMUNITY
Start: 2025-07-23 | End: 1900-01-01

## 2025-07-24 VITALS
WEIGHT: 187 LBS | HEART RATE: 76 BPM | SYSTOLIC BLOOD PRESSURE: 145 MMHG | DIASTOLIC BLOOD PRESSURE: 72 MMHG | OXYGEN SATURATION: 99 % | RESPIRATION RATE: 18 BRPM | BODY MASS INDEX: 29.29 KG/M2

## 2025-07-30 ENCOUNTER — NON-APPOINTMENT (OUTPATIENT)
Age: 75
End: 2025-07-30

## 2025-07-30 ENCOUNTER — OUTPATIENT (OUTPATIENT)
Dept: OUTPATIENT SERVICES | Facility: HOSPITAL | Age: 75
LOS: 1 days | End: 2025-07-30
Payer: MEDICARE

## 2025-07-30 ENCOUNTER — APPOINTMENT (OUTPATIENT)
Dept: CARDIOTHORACIC SURGERY | Facility: CLINIC | Age: 75
End: 2025-07-30
Payer: MEDICARE

## 2025-07-30 VITALS
WEIGHT: 181 LBS | OXYGEN SATURATION: 98 % | SYSTOLIC BLOOD PRESSURE: 112 MMHG | HEART RATE: 68 BPM | HEIGHT: 67 IN | DIASTOLIC BLOOD PRESSURE: 56 MMHG | BODY MASS INDEX: 28.41 KG/M2 | TEMPERATURE: 96.7 F

## 2025-07-30 DIAGNOSIS — Z98.890 OTHER SPECIFIED POSTPROCEDURAL STATES: Chronic | ICD-10-CM

## 2025-07-30 DIAGNOSIS — Z09 ENCOUNTER FOR FOLLOW-UP EXAMINATION AFTER COMPLETED TREATMENT FOR CONDITIONS OTHER THAN MALIGNANT NEOPLASM: ICD-10-CM

## 2025-07-30 DIAGNOSIS — Z95.1 PRESENCE OF AORTOCORONARY BYPASS GRAFT: ICD-10-CM

## 2025-07-30 PROCEDURE — 71046 X-RAY EXAM CHEST 2 VIEWS: CPT

## 2025-07-30 PROCEDURE — 71046 X-RAY EXAM CHEST 2 VIEWS: CPT | Mod: 26

## 2025-07-30 PROCEDURE — 99024 POSTOP FOLLOW-UP VISIT: CPT

## 2025-07-30 RX ORDER — SENNOSIDES 8.6 MG/1
8.6 TABLET ORAL
Qty: 60 | Refills: 1 | Status: ACTIVE | COMMUNITY
Start: 2025-07-30 | End: 1900-01-01

## 2025-08-13 RX ORDER — CLOPIDOGREL BISULFATE 75 MG/1
75 TABLET, FILM COATED ORAL
Qty: 30 | Refills: 0 | Status: ACTIVE | COMMUNITY
Start: 1900-01-01 | End: 1900-01-01

## 2025-08-13 RX ORDER — FUROSEMIDE 20 MG/1
20 TABLET ORAL
Qty: 30 | Refills: 0 | Status: ACTIVE | COMMUNITY
Start: 1900-01-01 | End: 1900-01-01

## 2025-08-13 RX ORDER — PANTOPRAZOLE 40 MG/1
40 TABLET, DELAYED RELEASE ORAL
Qty: 30 | Refills: 0 | Status: ACTIVE | COMMUNITY
Start: 1900-01-01 | End: 1900-01-01

## 2025-08-13 RX ORDER — POTASSIUM CHLORIDE 750 MG/1
10 TABLET, EXTENDED RELEASE ORAL DAILY
Qty: 30 | Refills: 0 | Status: ACTIVE | COMMUNITY
Start: 1900-01-01 | End: 1900-01-01

## 2025-08-20 ENCOUNTER — TRANSCRIPTION ENCOUNTER (OUTPATIENT)
Age: 75
End: 2025-08-20

## 2025-09-09 ENCOUNTER — RX RENEWAL (OUTPATIENT)
Age: 75
End: 2025-09-09

## (undated) DEVICE — DRSG MEPILEX 10 X 25CM (4 X 10") AG

## (undated) DEVICE — SUCTION YANKAUER BULBOUS TIP NO VENT

## (undated) DEVICE — SUT PROLENE 5-0 30" RB-2

## (undated) DEVICE — SUMP INTRACARDIAC/PERICARDIAL 20FR 1/4" ADULT

## (undated) DEVICE — CARDIOPLEGIA MANAGEMENT SET COLOR CODED CLAMPS

## (undated) DEVICE — DRAIN RESERVOIR FOR JACKSON PRATT 100CC CARDINAL

## (undated) DEVICE — SUT ETHIBOND 3-0 36" RB-1

## (undated) DEVICE — Device

## (undated) DEVICE — SUT MONOCRYL 4-0 27" PS-2 UNDYED

## (undated) DEVICE — SUCTION CATH AIRLIFE CONTROL VALVE TRIFLO 14FR

## (undated) DEVICE — SUT PROLENE 3-0 36" RB-1 HS-5

## (undated) DEVICE — SUT PDS PLUS 0 27" CT-1

## (undated) DEVICE — TOURNIQUET SET 12FR (1 RED, 2 BLUE, 3 CLEAR, 1 SNARE) 5.5"

## (undated) DEVICE — SUT PLEDGET SOFT MEDIUM 1/4" X 1/8" X 1/16" X6

## (undated) DEVICE — SUT SILK 2-0 18" SH (POP-OFF)

## (undated) DEVICE — SUT PROLENE 4-0 36" RB-1

## (undated) DEVICE — TONGUE DEPRESSOR

## (undated) DEVICE — PAD NERVE PHRENIC NERVE

## (undated) DEVICE — SUT DOUBLE 6 WIRE STERNAL

## (undated) DEVICE — DRSG MEPILEX 10 X 10CM (4 X 4") AG

## (undated) DEVICE — SPECIMEN CONTAINER 100ML

## (undated) DEVICE — VASCULAR DILATOR KIT 8,12,16,20, 24FR

## (undated) DEVICE — POSITIONER FOAM EGG CRATE ULNAR 2PCS (PINK)

## (undated) DEVICE — APPLICATOR PACK

## (undated) DEVICE — VESSEL LOOP MAXI-BLUE 0.120" X 16"

## (undated) DEVICE — CLIPPER BLADE GENERAL USE

## (undated) DEVICE — SUT PROLENE 6-0 30" RB-2

## (undated) DEVICE — SUT STAINLESS STEEL 7 4-18" CCS

## (undated) DEVICE — PACING CABLE (BROWN) A/V TEMP SCREW DOWN 12FT

## (undated) DEVICE — BLADE SCALPEL SAFETY #11 WITH PLASTIC GREEN HANDLE

## (undated) DEVICE — TUBING SMOKE EVAC 3/8" X 10FT FOR NEPTUNE

## (undated) DEVICE — BLOWER MISTER AXIUS WITH IV SET

## (undated) DEVICE — SUCTION TUBE CARDIAC FRAZIER 6FR SHAFT 3"

## (undated) DEVICE — PACING CABLE (BLUE) ATRIAL TEMP SCREW DOWN 12FT

## (undated) DEVICE — NDL ANGIOGRAPHIC ADVANCED 18G X 7CM THIN (SMOOTH)

## (undated) DEVICE — DRSG TAPE UMBILICAL COTTON 2" X 30 X 1/8"

## (undated) DEVICE — PACK PROCEDURE HARVEST SMARTPREP APC-60

## (undated) DEVICE — VENTING ADAPTER "Y" (RED/BLUE) 7.5"

## (undated) DEVICE — DRSG TEGADERM 4 X 4.75"

## (undated) DEVICE — SUT PROLENE 4-0 36" SH

## (undated) DEVICE — DRAPE MICROSHIELD FOR LEICA W CLEARLENS 41X64"

## (undated) DEVICE — CATH NG SALEM SUMP 16FR

## (undated) DEVICE — SUT VICRYL 2-0 27" CT-1

## (undated) DEVICE — CONNECTOR STRAIGHT 1/4 X 3/8"

## (undated) DEVICE — MARKING PEN W RULER

## (undated) DEVICE — APPLICATION TIP

## (undated) DEVICE — STABILIZER TISSUE OCTOPUS EVOLUTION

## (undated) DEVICE — ELCTR BOVIE TIP BLADE INSULATED 4" EDGE

## (undated) DEVICE — DRSG BIOPATCH DISK W CHG 1" W 4.0MM HOLE

## (undated) DEVICE — SUT VICRYL 1 36" CTX UNDYED

## (undated) DEVICE — CONNECTOR STRAIGHT 3/8 X 1/2"

## (undated) DEVICE — DRAPE SLUSH / WARMER 44 X 66"

## (undated) DEVICE — SUT PROLENE 3-0 36" SH

## (undated) DEVICE — DRAPE TOWEL BLUE 17" X 24"

## (undated) DEVICE — TOURNIQUET SET 12FR (1 RED, 1 BLUE, 3 CLEAR, 1 SNARE) 7"

## (undated) DEVICE — RIGID ADULT SUCKER

## (undated) DEVICE — SUT PDS II 2-0 27" CT-1

## (undated) DEVICE — BLADE SCALPEL SAFETY #15 WITH PLASTIC GREEN HANDLE

## (undated) DEVICE — SUT BOOT STANDARD (ORIGINAL YELLOW) 5 PAIR

## (undated) DEVICE — INS ST DBL SAFEJAW FGRTY

## (undated) DEVICE — PACK PROC CV DRAPE

## (undated) DEVICE — AROS VESSEL CLAMP SINGLE LARGE (YELLOW) 120G

## (undated) DEVICE — VASOVIEW HEMOPRO 2

## (undated) DEVICE — NDL COUNTER FOAM AND MAGNET 40-70

## (undated) DEVICE — TUBING SUCTION NONCONDUCTIVE 6MM X 12FT

## (undated) DEVICE — DRSG DERMABOND 0.7ML

## (undated) DEVICE — DRSG DERMABOND PRINEO 60CM

## (undated) DEVICE — SUT SILK 5-0 60" TIES

## (undated) DEVICE — DRAPE PROBE COVER 5" X 96"

## (undated) DEVICE — ELCTR STRYKER NEPTUNE SMOKE EVACUATION PENCIL (GREEN)

## (undated) DEVICE — SUT PROLENE 5-0 36" RB-1

## (undated) DEVICE — SUT NUROLON 1 18" OS-8 (POP-OFF)

## (undated) DEVICE — FOLEY TRAY 16FR 5CC LF LUBRISIL ADVANCE TEMP CLOSED

## (undated) DEVICE — GOWN LG

## (undated) DEVICE — SUT PROLENE 3-0 36" SH-1

## (undated) DEVICE — SUT PROLENE 6-0 30" BV-1

## (undated) DEVICE — CATH CV TRAY INSR ST UNIV

## (undated) DEVICE — DRAPE IOBAN 33" X 23"

## (undated) DEVICE — PUNCH VASC STD 7.75" HANDLE 4MM DISP

## (undated) DEVICE — DRSG TRACH DRAINAGE 4X4

## (undated) DEVICE — PACK OPEN HEART LNX

## (undated) DEVICE — PREP SCRUB BRUSH W CHG 4%

## (undated) DEVICE — SUT PLEDGET 9MM X 4MM X 1.5MM

## (undated) DEVICE — CATH TRIOX OXIMETRY 8F 3 LUMENS